# Patient Record
Sex: MALE | Race: WHITE | NOT HISPANIC OR LATINO | Employment: OTHER | URBAN - METROPOLITAN AREA
[De-identification: names, ages, dates, MRNs, and addresses within clinical notes are randomized per-mention and may not be internally consistent; named-entity substitution may affect disease eponyms.]

---

## 2017-01-06 ENCOUNTER — OFFICE VISIT (OUTPATIENT)
Dept: MEDICAL GROUP | Facility: CLINIC | Age: 72
End: 2017-01-06
Payer: MEDICARE

## 2017-01-06 VITALS
TEMPERATURE: 97.3 F | RESPIRATION RATE: 18 BRPM | HEIGHT: 71 IN | BODY MASS INDEX: 32.62 KG/M2 | HEART RATE: 94 BPM | SYSTOLIC BLOOD PRESSURE: 136 MMHG | OXYGEN SATURATION: 91 % | DIASTOLIC BLOOD PRESSURE: 80 MMHG | WEIGHT: 233 LBS

## 2017-01-06 DIAGNOSIS — R73.9 HYPERGLYCEMIA: ICD-10-CM

## 2017-01-06 DIAGNOSIS — K21.9 GASTROESOPHAGEAL REFLUX DISEASE WITHOUT ESOPHAGITIS: ICD-10-CM

## 2017-01-06 DIAGNOSIS — D75.89 MACROCYTOSIS WITHOUT ANEMIA: ICD-10-CM

## 2017-01-06 DIAGNOSIS — Z23 NEED FOR VACCINATION: ICD-10-CM

## 2017-01-06 DIAGNOSIS — E87.6 HYPOKALEMIA: ICD-10-CM

## 2017-01-06 DIAGNOSIS — E78.5 DYSLIPIDEMIA: ICD-10-CM

## 2017-01-06 DIAGNOSIS — I10 ESSENTIAL HYPERTENSION: ICD-10-CM

## 2017-01-06 PROCEDURE — 99204 OFFICE O/P NEW MOD 45 MIN: CPT | Mod: 25 | Performed by: INTERNAL MEDICINE

## 2017-01-06 PROCEDURE — G0009 ADMIN PNEUMOCOCCAL VACCINE: HCPCS | Performed by: INTERNAL MEDICINE

## 2017-01-06 PROCEDURE — 90732 PPSV23 VACC 2 YRS+ SUBQ/IM: CPT | Performed by: INTERNAL MEDICINE

## 2017-01-06 RX ORDER — POTASSIUM CHLORIDE 750 MG/1
20 TABLET, FILM COATED, EXTENDED RELEASE ORAL DAILY
Qty: 90 TAB | Refills: 1 | Status: SHIPPED | OUTPATIENT
Start: 2017-01-06 | End: 2017-07-24 | Stop reason: SDUPTHER

## 2017-01-06 ASSESSMENT — PATIENT HEALTH QUESTIONNAIRE - PHQ9: CLINICAL INTERPRETATION OF PHQ2 SCORE: 0

## 2017-01-06 NOTE — PROGRESS NOTES
Subjective:  Juma is a 71 y.o. male with the following   Past Medical History   Diagnosis Date   • Hyperlipidemia    • Hypertension      well controlled on meds   • Indigestion      pt on prilosec   • Heart burn    • Glaucoma      in right eye, no drops   • Arthritis      lower back   • GERD (gastroesophageal reflux disease)    • BPH (benign prostatic hyperplasia)       Family History   Problem Relation Age of Onset   • Cancer Mother 57     breast cancer     The patient is on the following medications:   Current outpatient prescriptions:   •  omeprazole (PRILOSEC) 40 MG delayed-release capsule, Take 1 Cap by mouth every day. (Patient taking differently: Take 40 mg by mouth every evening.), Disp: 90 Cap, Rfl: 3  •  tamsulosin (FLOMAX) 0.4 MG capsule, Take 1 Cap by mouth ONE-HALF HOUR AFTER BREAKFAST., Disp: 90 Cap, Rfl: 3  •  lovastatin (MEVACOR) 20 MG Tab, Take 1 Tab by mouth every day. (Patient taking differently: Take 20 mg by mouth every evening.), Disp: 90 Tab, Rfl: 3  •  hydrochlorothiazide (HYDRODIURIL) 25 MG Tab, Take 1 Tab by mouth every day., Disp: 90 Tab, Rfl: 3  •  metoprolol SR (TOPROL XL) 100 MG TABLET SR 24 HR, Take 1 Tab by mouth 2 Times a Day., Disp: 180 Tab, Rfl: 3  •  potassium chloride CR (K-DUR) 10 MEQ tablet, Take 2 Tabs by mouth every day. (Patient taking differently: Take 20 mEq by mouth every day. Pt states 2000 mg twice a day), Disp: 90 Tab, Rfl: 0  •  Coenzyme Q10 (CO Q10) 100 MG Cap, Take 300 mg by mouth every day., Disp: , Rfl:   •  Docusate Calcium (STOOL SOFTENER PO), Take 3 Tabs by mouth every day., Disp: , Rfl:   •  Cholecalciferol (VITAMIN D) 2000 UNITS CAPS, Take  by mouth., Disp: , Rfl:   •  Calcium Carbonate-Vit D-Min (QC CALCIUM/MINERALS/VITAMIN D PO), Take  by mouth., Disp: , Rfl:   •  MULTIPLE VITAMINS PO, Take  by mouth., Disp: , Rfl:   •  niacin 500 MG TABS, Take 500 mg by mouth every day., Disp: , Rfl:   •  Omega-3 Fatty Acids (FISH OIL) 1200 MG CAPS, Take  by mouth.,  "Disp: , Rfl:     HPI; 71 year-old male patient is here to establish care, currently on hydrochlorothiazide and metoprolol for hypertension denies having chest pain or shortness of breath, on lovastatin and Co  Q10 for dyslipidemia denies having myalgia, on omeprazole for GERD denies having dysphagia, on Flomax for BPH denies having nocturia, requesting refill of potassium for hypokalemia denies having palpitation.  ROS:  See HPI    Blood pressure 136/80, pulse 94, temperature 36.3 °C (97.3 °F), resp. rate 18, height 1.803 m (5' 10.98\"), weight 105.688 kg (233 lb), SpO2 91 %.on RA  Objective:  Patient is well appearing and in no acute distress.  Pharynx is clear.  Neck is soft and supple with no cervical or supraclavicular lymphadenopathy, thyromegaly or masses, no JVD.  Lungs clear to auscultation bilaterally with normal respiratory effort. Abdomen soft, non-tender on palpation,not distended. Heart regular rate and rhythm without murmur. Extremities without any clubbing, cyanosis, or edema.    Assessment and Plan:  1. HTN/ hypokalemia; on hydrochlorothiazide 25 mg daily, metoprolol  mg daily, patient has been out of potassium supplements.      2. GERD; response to omeprazole 40 mg daily      3. BPH; clinically is stable on Flomax 0.4 mg daily      4. Dyslipidemia; lovastatin 20 mg daily at bedtime plus CoQ10 100 mg daily      5. Increased BMI      6. Bladder cancer; status post cystoscopy and tumor resection.       7. Macrocytosis without anemia    8. Request of pneumonia vaccination    Patient is advised on preventive and supportive care, diet and lifestyle modification, daily walking ,exercise and weight loss. Refilled potassium, pneumonia vaccine , check basic labs are tender for evaluation.    Please note that this dictation was created using voice recognition software. I have worked with consultants from the vendor as well as technical experts from [employer] to optimize the interface. I have made " every reasonable attempt to correct obvious errors, but I expect that there are errors of grammar and possibly content that I did not discover before finalizing the note.

## 2017-01-06 NOTE — MR AVS SNAPSHOT
"        Juma Stern   2017 3:20 PM   Office Visit   MRN: [id number]    Department:  [facility]   Dept Phone:  [phone]    Description:  Male : 1945   Provider:  Lenora Drake M.D.           Allergies as of 2017     No Known Allergies      You were diagnosed with     Hypokalemia   [462104]       Essential hypertension   [8812098]       Gastroesophageal reflux disease without esophagitis   [070894]       Macrocytosis without anemia   [877662]       Hyperglycemia   [391235]       Need for vaccination   [407197]         Vital Signs     Blood Pressure Pulse Temperature Respirations Height Weight    136/80 mmHg 94 36.3 °C (97.3 °F) 18 1.803 m (5' 10.98\") 105.688 kg (233 lb)    Body Mass Index Oxygen Saturation Smoking Status             32.51 kg/m2 91% Former Smoker         Basic Information     Date Of Birth Sex Race Ethnicity Preferred Language    1945 Male White Non- English      Your appointments     2017  3:00 PM   Established Patient with Lenora Drake M.D.   [facility]    [address] [facility] [id number] [phone]           You will be receiving a confirmation call a few days before your appointment from our automated call confirmation system.              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   7/15/2016 - Present      Health Maintenance       " Date Due Completion Dates    IMM ZOSTER VACCINE 7/28/2006 ---    IMM INFLUENZA (1) 9/1/2016 1/14/2016    COLONOSCOPY 12/3/2016 12/3/2013    IMM PNEUMOCOCCAL 65+ (ADULT) LOW/MEDIUM RISK SERIES (2 of 2 - PPSV23) 1/14/2017 1/14/2016    IMM DTaP/Tdap/Td Vaccine (2 - Td) 5/23/2022 5/23/2012            Current Immunizations     13-VALENT PCV PREVNAR 1/14/2016    Influenza Vaccine Quad Inj (Preserved) 1/14/2016    Pneumococcal polysaccharide vaccine (PPSV-23)  Incomplete    Tdap Vaccine 5/23/2012      Below and/or attached are the medications your provider expects you to take. Review all of your home medications and newly ordered medications with your provider and/or pharmacist. Follow medication instructions as directed by your provider and/or pharmacist. Please keep your medication list with you and share with your provider. Update the information when medications are discontinued, doses are changed, or new medications (including over-the-counter products) are added; and carry medication information at all times in the event of emergency situations     Allergies:  No Known Allergies          Medications  Valid as of: January 06, 2017 -  3:20 PM    Generic Name Brand Name Tablet Size Instructions for use    Calcium Carbonate-Vit D-Min   Take  by mouth.        Cholecalciferol (Cap) Vitamin D 2000 UNITS Take  by mouth.        Coenzyme Q10 (Cap) Co Q10 100 MG Take 300 mg by mouth every day.        Docusate Calcium   Take 3 Tabs by mouth every day.        HydroCHLOROthiazide (Tab) HYDRODIURIL 25 MG Take 1 Tab by mouth every day.        Lovastatin (Tab) MEVACOR 20 MG Take 1 Tab by mouth every day.        Metoprolol Succinate (TABLET SR 24 HR) TOPROL  MG Take 1 Tab by mouth 2 Times a Day.        Multiple Vitamin   Take  by mouth.        Niacin (Tab) niacin 500 MG Take 500 mg by mouth every day.        Omega-3 Fatty Acids (Cap) Fish Oil 1200 MG Take  by mouth.        Omeprazole (CAPSULE DELAYED RELEASE) PRILOSEC 40 MG Take  1 Cap by mouth every day.        Potassium Chloride (Tab CR) KLOR-CON 10 MEQ Take 2 Tabs by mouth every day.        Tamsulosin HCl (Cap) FLOMAX 0.4 MG Take 1 Cap by mouth ONE-HALF HOUR AFTER BREAKFAST.        .                 Medicines prescribed today were sent to:     [id number] - [place], [place] - [address] [place] [address]    Phone: [phone] Fax: [phone]    Open 24 Hours?: No      Medication refill instructions:       If your prescription bottle indicates you have medication refills left, it is not necessary to call your provider’s office. Please contact your pharmacy and they will refill your medication.    If your prescription bottle indicates you do not have any refills left, you may request refills at any time through one of the following ways: The online YouScan system (except Urgent Care), by calling your provider’s office, or by asking your pharmacy to contact your provider’s office with a refill request. Medication refills are processed only during regular business hours and may not be available until the next business day. Your provider may request additional information or to have a follow-up visit with you prior to refilling your medication.   *Please Note: Medication refills are assigned a new Rx number when refilled electronically. Your pharmacy may indicate that no refills were authorized even though a new prescription for the same medication is available at the pharmacy. Please request the medicine by name with the pharmacy before contacting your provider for a refill.        Your To Do List     Future Labs/Procedures Complete By Expires    CBC WITH DIFFERENTIAL  As directed 1/7/2018    FOLATE  As directed 1/7/2018    HEMOGLOBIN A1C  As directed 1/7/2018    VITAMIN B12  As directed 1/7/2018         YouScan Access Code: Activation code not generated  Current YouScan Status: Active

## 2017-01-10 DIAGNOSIS — K21.9 GASTROESOPHAGEAL REFLUX DISEASE WITHOUT ESOPHAGITIS: ICD-10-CM

## 2017-01-10 DIAGNOSIS — I10 ESSENTIAL HYPERTENSION: ICD-10-CM

## 2017-01-10 DIAGNOSIS — E78.00 HYPERCHOLESTEREMIA: ICD-10-CM

## 2017-01-10 DIAGNOSIS — N40.0 BENIGN PROSTATIC HYPERPLASIA, PRESENCE OF LOWER URINARY TRACT SYMPTOMS UNSPECIFIED, UNSPECIFIED MORPHOLOGY: ICD-10-CM

## 2017-01-11 ENCOUNTER — TELEPHONE (OUTPATIENT)
Dept: MEDICAL GROUP | Facility: CLINIC | Age: 72
End: 2017-01-11

## 2017-01-11 RX ORDER — METOPROLOL SUCCINATE 100 MG/1
100 TABLET, EXTENDED RELEASE ORAL 2 TIMES DAILY
Qty: 180 TAB | Refills: 0 | Status: SHIPPED | OUTPATIENT
Start: 2017-01-11 | End: 2017-04-13 | Stop reason: SDUPTHER

## 2017-01-11 RX ORDER — TAMSULOSIN HYDROCHLORIDE 0.4 MG/1
0.4 CAPSULE ORAL
Qty: 90 CAP | Refills: 0 | Status: SHIPPED | OUTPATIENT
Start: 2017-01-11 | End: 2017-04-13 | Stop reason: SDUPTHER

## 2017-01-11 RX ORDER — LOVASTATIN 20 MG/1
20 TABLET ORAL DAILY
Qty: 90 TAB | Refills: 3 | Status: SHIPPED | OUTPATIENT
Start: 2017-01-11 | End: 2017-07-24 | Stop reason: SDUPTHER

## 2017-01-11 RX ORDER — HYDROCHLOROTHIAZIDE 25 MG/1
25 TABLET ORAL DAILY
Qty: 90 TAB | Refills: 0 | Status: SHIPPED | OUTPATIENT
Start: 2017-01-11 | End: 2017-04-13 | Stop reason: SDUPTHER

## 2017-01-11 RX ORDER — OMEPRAZOLE 40 MG/1
40 CAPSULE, DELAYED RELEASE ORAL DAILY
Qty: 90 CAP | Refills: 3 | Status: SHIPPED | OUTPATIENT
Start: 2017-01-11 | End: 2017-07-24 | Stop reason: SDUPTHER

## 2017-01-11 NOTE — TELEPHONE ENCOUNTER
I rec'd a phone call from [facility] they are asking for clarification on the metoprolol ER that was sent in for BID, usually is taken daily.  They would like a call back @ PH: [phone] Noel or Jaja.  To Dr. Drake to advise, thank you.

## 2017-01-12 NOTE — TELEPHONE ENCOUNTER
Please call and inform [facility] pharmacy based on our record patient is on metoprolol  mg once daily. Thanks

## 2017-02-02 ENCOUNTER — HOSPITAL ENCOUNTER (OUTPATIENT)
Dept: LAB | Facility: MEDICAL CENTER | Age: 72
End: 2017-02-02
Attending: INTERNAL MEDICINE
Payer: MEDICARE

## 2017-02-02 DIAGNOSIS — D75.89 MACROCYTOSIS WITHOUT ANEMIA: ICD-10-CM

## 2017-02-02 DIAGNOSIS — R73.9 HYPERGLYCEMIA: ICD-10-CM

## 2017-02-02 LAB
ALBUMIN SERPL BCP-MCNC: 4.5 G/DL (ref 3.2–4.9)
ALBUMIN/GLOB SERPL: 1.5 G/DL
ALP SERPL-CCNC: 67 U/L (ref 30–99)
ALT SERPL-CCNC: 31 U/L (ref 2–50)
ANION GAP SERPL CALC-SCNC: 8 MMOL/L (ref 0–11.9)
AST SERPL-CCNC: 24 U/L (ref 12–45)
BASOPHILS # BLD AUTO: 0.07 K/UL (ref 0–0.12)
BASOPHILS NFR BLD AUTO: 0.9 % (ref 0–1.8)
BILIRUB SERPL-MCNC: 0.9 MG/DL (ref 0.1–1.5)
BUN SERPL-MCNC: 19 MG/DL (ref 8–22)
CALCIUM SERPL-MCNC: 9.5 MG/DL (ref 8.5–10.5)
CHLORIDE SERPL-SCNC: 104 MMOL/L (ref 96–112)
CO2 SERPL-SCNC: 27 MMOL/L (ref 20–33)
CREAT SERPL-MCNC: 0.79 MG/DL (ref 0.5–1.4)
EOSINOPHIL # BLD: 0.19 K/UL (ref 0–0.51)
EOSINOPHIL NFR BLD AUTO: 2.5 % (ref 0–6.9)
ERYTHROCYTE [DISTWIDTH] IN BLOOD BY AUTOMATED COUNT: 44.2 FL (ref 35.9–50)
EST. AVERAGE GLUCOSE BLD GHB EST-MCNC: 111 MG/DL
FOLATE SERPL-MCNC: >23.6 NG/ML
GLOBULIN SER CALC-MCNC: 3 G/DL (ref 1.9–3.5)
GLUCOSE SERPL-MCNC: 109 MG/DL (ref 65–99)
HBA1C MFR BLD: 5.5 % (ref 0–5.6)
HCT VFR BLD AUTO: 45 % (ref 42–52)
HGB BLD-MCNC: 15.1 G/DL (ref 14–18)
IMM GRANULOCYTES # BLD AUTO: 0.02 K/UL (ref 0–0.11)
IMM GRANULOCYTES NFR BLD AUTO: 0.3 % (ref 0–0.9)
LYMPHOCYTES # BLD: 2.3 K/UL (ref 1–4.8)
LYMPHOCYTES NFR BLD AUTO: 30.3 % (ref 22–41)
MCH RBC QN AUTO: 34.2 PG (ref 27–33)
MCHC RBC AUTO-ENTMCNC: 33.6 G/DL (ref 33.7–35.3)
MCV RBC AUTO: 101.8 FL (ref 81.4–97.8)
MONOCYTES # BLD: 0.85 K/UL (ref 0–0.85)
MONOCYTES NFR BLD AUTO: 11.2 % (ref 0–13.4)
NEUTROPHILS # BLD: 4.16 K/UL (ref 1.82–7.42)
NEUTROPHILS NFR BLD AUTO: 54.8 % (ref 44–72)
NRBC # BLD AUTO: 0 K/UL
NRBC BLD-RTO: 0 /100 WBC
PLATELET # BLD AUTO: 216 K/UL (ref 164–446)
PMV BLD AUTO: 9.9 FL (ref 9–12.9)
POTASSIUM SERPL-SCNC: 3.6 MMOL/L (ref 3.6–5.5)
PROT SERPL-MCNC: 7.5 G/DL (ref 6–8.2)
RBC # BLD AUTO: 4.42 M/UL (ref 4.7–6.1)
SODIUM SERPL-SCNC: 139 MMOL/L (ref 135–145)
VIT B12 SERPL-MCNC: 1107 PG/ML (ref 211–911)
WBC # BLD AUTO: 7.6 K/UL (ref 4.8–10.8)

## 2017-02-02 PROCEDURE — 85025 COMPLETE CBC W/AUTO DIFF WBC: CPT

## 2017-02-02 PROCEDURE — 82746 ASSAY OF FOLIC ACID SERUM: CPT

## 2017-02-02 PROCEDURE — 80053 COMPREHEN METABOLIC PANEL: CPT

## 2017-02-02 PROCEDURE — 36415 COLL VENOUS BLD VENIPUNCTURE: CPT

## 2017-02-02 PROCEDURE — 83695 ASSAY OF LIPOPROTEIN(A): CPT

## 2017-02-02 PROCEDURE — 82607 VITAMIN B-12: CPT

## 2017-02-02 PROCEDURE — 83036 HEMOGLOBIN GLYCOSYLATED A1C: CPT

## 2017-02-04 LAB — LPA SERPL-MCNC: 62 MG/DL

## 2017-02-07 ENCOUNTER — HOSPITAL ENCOUNTER (OUTPATIENT)
Facility: MEDICAL CENTER | Age: 72
End: 2017-02-07
Attending: UROLOGY
Payer: MEDICARE

## 2017-02-07 LAB — CYTOLOGY REG CYTOL: NORMAL

## 2017-02-07 PROCEDURE — 88112 CYTOPATH CELL ENHANCE TECH: CPT

## 2017-02-08 ENCOUNTER — OFFICE VISIT (OUTPATIENT)
Dept: MEDICAL GROUP | Facility: CLINIC | Age: 72
End: 2017-02-08
Payer: MEDICARE

## 2017-02-08 VITALS
WEIGHT: 230 LBS | SYSTOLIC BLOOD PRESSURE: 138 MMHG | DIASTOLIC BLOOD PRESSURE: 80 MMHG | TEMPERATURE: 97.6 F | BODY MASS INDEX: 32.2 KG/M2 | RESPIRATION RATE: 18 BRPM | HEART RATE: 74 BPM | OXYGEN SATURATION: 94 % | HEIGHT: 71 IN

## 2017-02-08 DIAGNOSIS — K21.9 GASTROESOPHAGEAL REFLUX DISEASE WITHOUT ESOPHAGITIS: ICD-10-CM

## 2017-02-08 DIAGNOSIS — I10 ESSENTIAL HYPERTENSION: ICD-10-CM

## 2017-02-08 DIAGNOSIS — E66.9 OBESITY (BMI 30-39.9): ICD-10-CM

## 2017-02-08 DIAGNOSIS — D75.89 MACROCYTOSIS: ICD-10-CM

## 2017-02-08 DIAGNOSIS — E78.5 DYSLIPIDEMIA: ICD-10-CM

## 2017-02-08 DIAGNOSIS — N40.0 BENIGN NON-NODULAR PROSTATIC HYPERPLASIA WITHOUT LOWER URINARY TRACT SYMPTOMS: ICD-10-CM

## 2017-02-08 PROCEDURE — 3017F COLORECTAL CA SCREEN DOC REV: CPT | Performed by: INTERNAL MEDICINE

## 2017-02-08 PROCEDURE — G8484 FLU IMMUNIZE NO ADMIN: HCPCS | Performed by: INTERNAL MEDICINE

## 2017-02-08 PROCEDURE — 1101F PT FALLS ASSESS-DOCD LE1/YR: CPT | Performed by: INTERNAL MEDICINE

## 2017-02-08 PROCEDURE — 4040F PNEUMOC VAC/ADMIN/RCVD: CPT | Performed by: INTERNAL MEDICINE

## 2017-02-08 PROCEDURE — G8432 DEP SCR NOT DOC, RNG: HCPCS | Performed by: INTERNAL MEDICINE

## 2017-02-08 PROCEDURE — 99214 OFFICE O/P EST MOD 30 MIN: CPT | Performed by: INTERNAL MEDICINE

## 2017-02-08 PROCEDURE — 1036F TOBACCO NON-USER: CPT | Performed by: INTERNAL MEDICINE

## 2017-02-08 PROCEDURE — G8419 CALC BMI OUT NRM PARAM NOF/U: HCPCS | Performed by: INTERNAL MEDICINE

## 2017-02-08 NOTE — MR AVS SNAPSHOT
"        Juma Stern   2017 3:00 PM   Office Visit   MRN: [id number]    Department:  [facility]   Dept Phone:  [phone]    Description:  Male : 1945   Provider:  Lenora Drake M.D.           Allergies as of 2017     No Known Allergies      You were diagnosed with     Essential hypertension   [9867799]       Dyslipidemia   [373833]       Macrocytosis   [508672]         Vital Signs     Blood Pressure Pulse Temperature Respirations Height Weight    138/80 mmHg 74 36.4 °C (97.6 °F) 18 1.803 m (5' 10.98\") 104.327 kg (230 lb)    Body Mass Index Oxygen Saturation Smoking Status             32.09 kg/m2 94% Former Smoker         Basic Information     Date Of Birth Sex Race Ethnicity Preferred Language    1945 Male White Non- English      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 Review all of your " home medications and newly ordered medications with your provider and/or pharmacist. Follow medication instructions as directed by your provider and/or pharmacist. Please keep your medication list with you and share with your provider. Update the information when medications are discontinued, doses are changed, or new medications (including over-the-counter products) are added; and carry medication information at all times in the event of emergency situations     Allergies:  No Known Allergies          Medications  Valid as of: February 08, 2017 -  3:24 PM    Generic Name Brand Name Tablet Size Instructions for use    Calcium Carbonate-Vit D-Min   Take  by mouth.        Cholecalciferol (Cap) Vitamin D 2000 UNITS Take  by mouth.        Coenzyme Q10 (Cap) Co Q10 100 MG Take 300 mg by mouth every day.        Docusate Calcium   Take 3 Tabs by mouth every day.        HydroCHLOROthiazide (Tab) HYDRODIURIL 25 MG Take 1 Tab by mouth every day.        Lovastatin (Tab) MEVACOR 20 MG Take 1 Tab by mouth every day.        Metoprolol Succinate (TABLET SR 24 HR) TOPROL  MG Take 1 Tab by mouth 2 Times a Day.        Multiple Vitamin   Take  by mouth.        Niacin (Tab) niacin 500 MG Take 500 mg by mouth every day.        Omega-3 Fatty Acids (Cap) Fish Oil 1200 MG Take  by mouth.        Omeprazole (CAPSULE DELAYED RELEASE) PRILOSEC 40 MG Take 1 Cap by mouth every day.        Potassium Chloride (Tab CR) KLOR-CON 10 MEQ Take 2 Tabs by mouth every day.        Tamsulosin HCl (Cap) FLOMAX 0.4 MG Take 1 Cap by mouth ONE-HALF HOUR AFTER BREAKFAST.        .                 Medicines prescribed today were sent to:     [facility] [address] [place] [address], [place] - [address] [address]    Phone: [phone] Fax: [phone]    Open 24 Hours?: No      Medication refill instructions:       If your prescription bottle indicates you have medication refills left, it is not necessary to call your provider’s  office. Please contact your pharmacy and they will refill your medication.    If your prescription bottle indicates you do not have any refills left, you may request refills at any time through one of the following ways: The online [employer] system (except Urgent Care), by calling your provider’s office, or by asking your pharmacy to contact your provider’s office with a refill request. Medication refills are processed only during regular business hours and may not be available until the next business day. Your provider may request additional information or to have a follow-up visit with you prior to refilling your medication.   *Please Note: Medication refills are assigned a new Rx number when refilled electronically. Your pharmacy may indicate that no refills were authorized even though a new prescription for the same medication is available at the pharmacy. Please request the medicine by name with the pharmacy before contacting your provider for a refill.        Your To Do List     Future Labs/Procedures Complete By Expires    CBC WITH DIFFERENTIAL  As directed 2/9/2018    CREATININE  As directed 2/8/2018    LIPOPROTEIN A  As directed 2/8/2018    VITAMIN B12  As directed 2/9/2018         [employer]ingo Access Code: Activation code not generated  Current Colingo Status: Active

## 2017-02-08 NOTE — PROGRESS NOTES
Subjective:  Juma is a 71 y.o. male with the following   Past Medical History   Diagnosis Date   • Hyperlipidemia    • Hypertension      well controlled on meds   • Indigestion      pt on prilosec   • Heart burn    • Glaucoma      in right eye, no drops   • Arthritis      lower back   • GERD (gastroesophageal reflux disease)    • BPH (benign prostatic hyperplasia)       Family History   Problem Relation Age of Onset   • Cancer Mother 57     breast cancer     The patient is on the following medications:   Current outpatient prescriptions:   •  tamsulosin (FLOMAX) 0.4 MG capsule, Take 1 Cap by mouth ONE-HALF HOUR AFTER BREAKFAST., Disp: 90 Cap, Rfl: 0  •  omeprazole (PRILOSEC) 40 MG delayed-release capsule, Take 1 Cap by mouth every day., Disp: 90 Cap, Rfl: 3  •  metoprolol SR (TOPROL XL) 100 MG TABLET SR 24 HR, Take 1 Tab by mouth 2 Times a Day., Disp: 180 Tab, Rfl: 0  •  lovastatin (MEVACOR) 20 MG Tab, Take 1 Tab by mouth every day., Disp: 90 Tab, Rfl: 3  •  hydrochlorothiazide (HYDRODIURIL) 25 MG Tab, Take 1 Tab by mouth every day., Disp: 90 Tab, Rfl: 0  •  potassium chloride ER (KLOR-CON) 10 MEQ tablet, Take 2 Tabs by mouth every day., Disp: 90 Tab, Rfl: 1  •  Coenzyme Q10 (CO Q10) 100 MG Cap, Take 300 mg by mouth every day., Disp: , Rfl:   •  Docusate Calcium (STOOL SOFTENER PO), Take 3 Tabs by mouth every day., Disp: , Rfl:   •  Cholecalciferol (VITAMIN D) 2000 UNITS CAPS, Take  by mouth., Disp: , Rfl:   •  Calcium Carbonate-Vit D-Min (QC CALCIUM/MINERALS/VITAMIN D PO), Take  by mouth., Disp: , Rfl:   •  MULTIPLE VITAMINS PO, Take  by mouth., Disp: , Rfl:   •  niacin 500 MG TABS, Take 500 mg by mouth every day., Disp: , Rfl:   •  Omega-3 Fatty Acids (FISH OIL) 1200 MG CAPS, Take  by mouth., Disp: , Rfl:     HPI; Patient is here today for follow-up visit regarding his recent labs, currently on hydrochlorothiazide and metoprolol for hypertension and potassium for hypokalemia denies having chest pain, shortness  "of breath or palpitation. Patient is also on omeprazole for GERD denies having dysphagia, on Flomax for BPH denies having nocturia, on lovastatin plus CoQ10 denies having myalgia fatigue.   ROS:  See HPI    Blood pressure 138/80, pulse 74, temperature 36.4 °C (97.6 °F), resp. rate 18, height 1.803 m (5' 10.98\"), weight 104.327 kg (230 lb), SpO2 94 %.on RA  Objective:  Patient is well appearing and in no acute distress.  Pharynx is clear.  Neck is soft and supple with no cervical or supraclavicular lymphadenopathy, thyromegaly or masses, no JVD.  Lungs clear to auscultation bilaterally with normal respiratory effort. Abdomen soft, non-tender on palpation,not distended. Heart regular rate and rhythm without murmur. Extremities without any clubbing, cyanosis, or edema.    Assessment and Plan:  1. HTN/ hypokalemia; on hydrochlorothiazide 25 mg daily, metoprolol  mg daily,  potassium supplements.   Ref. Range 7/8/2016 14:48 2/2/2017 12:20   Potassium Latest Ref Range: 3.6-5.5 mmol/L 3.2 (L) 3.6         2. GERD; responds to omeprazole 40 mg daily      3. BPH; clinically is stable on Flomax 0.4 mg daily      4. Dyslipidemia; lovastatin 20 mg daily at bedtime plus CoQ10 100 mg daily.    Ref. Range 2/26/2016 10:35 2/2/2017 12:20   Cholesterol,Tot Latest Ref Range: 100-199 mg/dL 176    Triglycerides Latest Ref Range: 0-149 mg/dL 129    HDL Latest Ref Range: >=40 mg/dL 36 (A)    LDL Latest Ref Range: <100 mg/dL 114 (H)    Lipoprotein A Latest Ref Range: <=29 mg/dL  62 (H)         5. Increased BMI      6. Bladder cancer; status post cystoscopy and tumor resection.       7. Macrocytosis without anemia; patient denies excessive chronic drinking, currently off B12 supplements.    Ref. Range 4/18/2016 15:35 7/8/2016 11:00 2/2/2017 12:20   Hemoglobin Latest Ref Range: 14.0-18.0 g/dL 15.1 15.9 15.1   Hematocrit Latest Ref Range: 42.0-52.0 % 44.5 46.6 45.0   MCV Latest Ref Range: 81.4-97.8 fL 98.7 (H) 100.9 (H) 101.8 (H)      " Ref. Range 2/2/2017 12:20   Vitamin B12 -True Cobalamin Latest Ref Range: 211-911 pg/mL 1107 (H)   Folate -Folic Acid Latest Ref Range: >4.0 ng/mL >23.6     8. History of hyperglycemia   Ref. Range 2/2/2017 12:20   Glycohemoglobin Latest Ref Range: 0.0-5.6 % 5.5       Patient is advised on preventive and supportive care, diet and lifestyle modification, daily walking ,exercise and weight loss. Discussed alternative therapies were elevated lipoprotein a , Continue current meds and monitor labs.         Please note that this dictation was created using voice recognition software. I have worked with consultants from the vendor as well as technical experts from [employer] to optimize the interface. I have made every reasonable attempt to correct obvious errors, but I expect that there are errors of grammar and possibly content that I did not discover before finalizing the note.

## 2017-02-09 PROBLEM — E66.9 OBESITY (BMI 30-39.9): Status: ACTIVE | Noted: 2017-02-09

## 2017-04-13 DIAGNOSIS — N40.0 BENIGN PROSTATIC HYPERPLASIA, PRESENCE OF LOWER URINARY TRACT SYMPTOMS UNSPECIFIED, UNSPECIFIED MORPHOLOGY: ICD-10-CM

## 2017-04-13 DIAGNOSIS — I10 ESSENTIAL HYPERTENSION: ICD-10-CM

## 2017-04-14 RX ORDER — METOPROLOL SUCCINATE 100 MG/1
TABLET, EXTENDED RELEASE ORAL
Qty: 180 TAB | Refills: 0 | Status: SHIPPED | OUTPATIENT
Start: 2017-04-14 | End: 2017-07-24 | Stop reason: SDUPTHER

## 2017-04-14 RX ORDER — HYDROCHLOROTHIAZIDE 25 MG/1
TABLET ORAL
Qty: 90 TAB | Refills: 0 | Status: SHIPPED | OUTPATIENT
Start: 2017-04-14 | End: 2017-07-24 | Stop reason: SDUPTHER

## 2017-04-14 RX ORDER — TAMSULOSIN HYDROCHLORIDE 0.4 MG/1
CAPSULE ORAL
Qty: 90 CAP | Refills: 0 | Status: SHIPPED | OUTPATIENT
Start: 2017-04-14 | End: 2017-07-24 | Stop reason: SDUPTHER

## 2017-05-16 ENCOUNTER — HOSPITAL ENCOUNTER (OUTPATIENT)
Dept: LAB | Facility: MEDICAL CENTER | Age: 72
End: 2017-05-16
Attending: UROLOGY
Payer: MEDICARE

## 2017-05-16 LAB — CYTOLOGY REG CYTOL: NORMAL

## 2017-05-16 PROCEDURE — 88112 CYTOPATH CELL ENHANCE TECH: CPT

## 2017-07-10 ENCOUNTER — PATIENT OUTREACH (OUTPATIENT)
Dept: HEALTH INFORMATION MANAGEMENT | Facility: OTHER | Age: 72
End: 2017-07-10

## 2017-07-10 NOTE — PROGRESS NOTES
Outcome: NO ANSWER // NO VOICEMAIL SET UP    WebIZ Checked & Epic Updated:  yes    [facility] Verified: yes    Attempt # 1ST

## 2017-07-17 ENCOUNTER — TELEPHONE (OUTPATIENT)
Dept: HEALTH INFORMATION MANAGEMENT | Facility: OTHER | Age: 72
End: 2017-07-17

## 2017-07-17 DIAGNOSIS — Z12.11 SCREENING FOR COLON CANCER: ICD-10-CM

## 2017-07-17 NOTE — PROGRESS NOTES
Attempt #:2    WebIZ Checked & Epic Updated: yes  [facility] Verified: yes  Verify PCP: yes    Communication Preference Obtained: yes     Review Care Team: yes    Annual Wellness Visit Scheduling  1. Scheduling Status:Scheduled          Care Gap Scheduling (Attempt to Schedule EACH Overdue Care Gap!)     Health Maintenance Due   Topic Date Due   • Annual Wellness Visit  Scheduled   • IMM ZOSTER VACCINE  Scheduled   • COLONOSCOPY  Referral placed         MyChart Activation: already active  [employer] Brain: no  Virtual Visits: no  Opt In to Text Messages: no

## 2017-07-18 ENCOUNTER — HOSPITAL ENCOUNTER (OUTPATIENT)
Dept: LAB | Facility: MEDICAL CENTER | Age: 72
End: 2017-07-18
Attending: INTERNAL MEDICINE
Payer: MEDICARE

## 2017-07-18 DIAGNOSIS — E78.5 DYSLIPIDEMIA: ICD-10-CM

## 2017-07-18 DIAGNOSIS — I10 ESSENTIAL HYPERTENSION: ICD-10-CM

## 2017-07-18 DIAGNOSIS — D75.89 MACROCYTOSIS: ICD-10-CM

## 2017-07-18 LAB
BASOPHILS # BLD AUTO: 1.1 % (ref 0–1.8)
BASOPHILS # BLD: 0.08 K/UL (ref 0–0.12)
CREAT SERPL-MCNC: 0.71 MG/DL (ref 0.5–1.4)
EOSINOPHIL # BLD AUTO: 0.17 K/UL (ref 0–0.51)
EOSINOPHIL NFR BLD: 2.4 % (ref 0–6.9)
ERYTHROCYTE [DISTWIDTH] IN BLOOD BY AUTOMATED COUNT: 41.3 FL (ref 35.9–50)
GFR SERPL CREATININE-BSD FRML MDRD: >60 ML/MIN/1.73 M 2
HCT VFR BLD AUTO: 45 % (ref 42–52)
HGB BLD-MCNC: 15.4 G/DL (ref 14–18)
IMM GRANULOCYTES # BLD AUTO: 0.02 K/UL (ref 0–0.11)
IMM GRANULOCYTES NFR BLD AUTO: 0.3 % (ref 0–0.9)
LYMPHOCYTES # BLD AUTO: 1.93 K/UL (ref 1–4.8)
LYMPHOCYTES NFR BLD: 27 % (ref 22–41)
MCH RBC QN AUTO: 33.5 PG (ref 27–33)
MCHC RBC AUTO-ENTMCNC: 34.2 G/DL (ref 33.7–35.3)
MCV RBC AUTO: 97.8 FL (ref 81.4–97.8)
MONOCYTES # BLD AUTO: 0.7 K/UL (ref 0–0.85)
MONOCYTES NFR BLD AUTO: 9.8 % (ref 0–13.4)
NEUTROPHILS # BLD AUTO: 4.26 K/UL (ref 1.82–7.42)
NEUTROPHILS NFR BLD: 59.4 % (ref 44–72)
NRBC # BLD AUTO: 0 K/UL
NRBC BLD AUTO-RTO: 0 /100 WBC
PLATELET # BLD AUTO: 212 K/UL (ref 164–446)
PMV BLD AUTO: 10 FL (ref 9–12.9)
RBC # BLD AUTO: 4.6 M/UL (ref 4.7–6.1)
VIT B12 SERPL-MCNC: 1108 PG/ML (ref 211–911)
WBC # BLD AUTO: 7.2 K/UL (ref 4.8–10.8)

## 2017-07-18 PROCEDURE — 85025 COMPLETE CBC W/AUTO DIFF WBC: CPT

## 2017-07-18 PROCEDURE — 82607 VITAMIN B-12: CPT

## 2017-07-18 PROCEDURE — 83695 ASSAY OF LIPOPROTEIN(A): CPT

## 2017-07-18 PROCEDURE — 82565 ASSAY OF CREATININE: CPT

## 2017-07-18 PROCEDURE — 36415 COLL VENOUS BLD VENIPUNCTURE: CPT

## 2017-07-20 LAB — LPA SERPL-MCNC: 37 MG/DL

## 2017-07-24 ENCOUNTER — OFFICE VISIT (OUTPATIENT)
Dept: MEDICAL GROUP | Facility: PHYSICIAN GROUP | Age: 72
End: 2017-07-24
Payer: MEDICARE

## 2017-07-24 VITALS
BODY MASS INDEX: 33.46 KG/M2 | HEART RATE: 87 BPM | HEIGHT: 71 IN | DIASTOLIC BLOOD PRESSURE: 74 MMHG | OXYGEN SATURATION: 91 % | TEMPERATURE: 98.2 F | RESPIRATION RATE: 18 BRPM | SYSTOLIC BLOOD PRESSURE: 138 MMHG | WEIGHT: 239 LBS

## 2017-07-24 DIAGNOSIS — E78.5 DYSLIPIDEMIA: ICD-10-CM

## 2017-07-24 DIAGNOSIS — N40.0 BENIGN PROSTATIC HYPERPLASIA, PRESENCE OF LOWER URINARY TRACT SYMPTOMS UNSPECIFIED, UNSPECIFIED MORPHOLOGY: ICD-10-CM

## 2017-07-24 DIAGNOSIS — E87.6 HYPOKALEMIA: ICD-10-CM

## 2017-07-24 DIAGNOSIS — E66.9 OBESITY (BMI 30-39.9): ICD-10-CM

## 2017-07-24 DIAGNOSIS — D75.89 MACROCYTOSIS WITHOUT ANEMIA: ICD-10-CM

## 2017-07-24 DIAGNOSIS — K21.9 GASTROESOPHAGEAL REFLUX DISEASE WITHOUT ESOPHAGITIS: ICD-10-CM

## 2017-07-24 DIAGNOSIS — I10 ESSENTIAL HYPERTENSION: ICD-10-CM

## 2017-07-24 PROCEDURE — 99214 OFFICE O/P EST MOD 30 MIN: CPT | Performed by: INTERNAL MEDICINE

## 2017-07-24 RX ORDER — OMEPRAZOLE 40 MG/1
40 CAPSULE, DELAYED RELEASE ORAL DAILY
Qty: 90 CAP | Refills: 3 | Status: SHIPPED | OUTPATIENT
Start: 2017-07-24 | End: 2018-02-07 | Stop reason: SDUPTHER

## 2017-07-24 RX ORDER — TAMSULOSIN HYDROCHLORIDE 0.4 MG/1
0.4 CAPSULE ORAL
Qty: 90 CAP | Refills: 3 | Status: SHIPPED | OUTPATIENT
Start: 2017-07-24 | End: 2018-02-07 | Stop reason: SDUPTHER

## 2017-07-24 RX ORDER — POTASSIUM CHLORIDE 750 MG/1
20 TABLET, FILM COATED, EXTENDED RELEASE ORAL DAILY
Qty: 90 TAB | Refills: 3 | Status: SHIPPED | OUTPATIENT
Start: 2017-07-24 | End: 2018-02-07 | Stop reason: SDUPTHER

## 2017-07-24 RX ORDER — HYDROCHLOROTHIAZIDE 25 MG/1
25 TABLET ORAL
Qty: 90 TAB | Refills: 3 | Status: SHIPPED | OUTPATIENT
Start: 2017-07-24 | End: 2018-02-07 | Stop reason: SDUPTHER

## 2017-07-24 RX ORDER — METOPROLOL SUCCINATE 100 MG/1
200 TABLET, EXTENDED RELEASE ORAL DAILY
Qty: 180 TAB | Refills: 3 | Status: SHIPPED | OUTPATIENT
Start: 2017-07-24 | End: 2018-02-07 | Stop reason: SDUPTHER

## 2017-07-24 RX ORDER — LOVASTATIN 20 MG/1
20 TABLET ORAL DAILY
Qty: 90 TAB | Refills: 3 | Status: SHIPPED | OUTPATIENT
Start: 2017-07-24 | End: 2018-02-07 | Stop reason: SDUPTHER

## 2017-07-24 NOTE — PROGRESS NOTES
Subjective:  Juma is a 71 y.o. male with the following   Past Medical History   Diagnosis Date   • Hyperlipidemia    • Hypertension      well controlled on meds   • Indigestion      pt on prilosec   • Heart burn    • Glaucoma      in right eye, no drops   • Arthritis      lower back   • GERD (gastroesophageal reflux disease)    • BPH (benign prostatic hyperplasia)       Family History   Problem Relation Age of Onset   • Cancer Mother 57     breast cancer     The patient is on the following medications:   Current outpatient prescriptions:   •  tamsulosin (FLOMAX) 0.4 MG capsule, TAKE 1 CAPSULE BY MOUTH ONE-HALF HOUR AFTER BREAKFAST., Disp: 90 Cap, Rfl: 0  •  hydrochlorothiazide (HYDRODIURIL) 25 MG Tab, TAKE 1 TABLET BY MOUTH EVERY DAY, Disp: 90 Tab, Rfl: 0  •  metoprolol SR (TOPROL XL) 100 MG TABLET SR 24 HR, TAKE 1 TABLET BY MOUTH TWICE A DAY, Disp: 180 Tab, Rfl: 0  •  omeprazole (PRILOSEC) 40 MG delayed-release capsule, Take 1 Cap by mouth every day., Disp: 90 Cap, Rfl: 3  •  lovastatin (MEVACOR) 20 MG Tab, Take 1 Tab by mouth every day., Disp: 90 Tab, Rfl: 3  •  potassium chloride ER (KLOR-CON) 10 MEQ tablet, Take 2 Tabs by mouth every day., Disp: 90 Tab, Rfl: 1  •  Coenzyme Q10 (CO Q10) 100 MG Cap, Take 300 mg by mouth every day., Disp: , Rfl:   •  Docusate Calcium (STOOL SOFTENER PO), Take 3 Tabs by mouth every day., Disp: , Rfl:   •  Cholecalciferol (VITAMIN D) 2000 UNITS CAPS, Take  by mouth., Disp: , Rfl:   •  Calcium Carbonate-Vit D-Min (QC CALCIUM/MINERALS/VITAMIN D PO), Take  by mouth., Disp: , Rfl:   •  MULTIPLE VITAMINS PO, Take  by mouth., Disp: , Rfl:   •  niacin 500 MG TABS, Take 500 mg by mouth every day., Disp: , Rfl:   •  Omega-3 Fatty Acids (FISH OIL) 1200 MG CAPS, Take  by mouth., Disp: , Rfl:     HPI; Patient is here today for follow-up visit regarding his recent labs, currently on metoprolol, hydrochlorothiazide and potassium for hypertension denies having chest pain, palpitation or shortness  "of breath. Patient continues with omeprazole as needed for care denies having dysphagia, Flomax for BPH denies having nocturia, lovastatin for dyslipidemia denies having myalgia.   ROS:  See HPI    Blood pressure 138/74, pulse 87, temperature 36.8 °C (98.2 °F), resp. rate 18, height 1.803 m (5' 10.98\"), weight 108.41 kg (239 lb), SpO2 91 %.on RA  Objective:  Patient is well appearing and in no acute distress.  Pharynx is clear.  Neck is soft and supple with no cervical or supraclavicular lymphadenopathy, thyromegaly or masses, no JVD.  Lungs clear to auscultation bilaterally with normal respiratory effort. Abdomen soft, non-tender on palpation,not distended. Heart regular rate and rhythm without murmur. Extremities without any clubbing, cyanosis, or edema. Central recently .      Assessment and Plan:  1. HTN/ hypokalemia; on hydrochlorothiazide 25 mg daily, metoprolol  mg daily,  potassium supplements.    Ref. Range  7/8/2016 14:48  2/2/2017 12:20    Potassium  Latest Ref Range: 3.6-5.5 mmol/L  3.2 (L)  3.6          2. GERD; responds to omeprazole 40 mg daily      3. BPH; clinically is stable on Flomax 0.4 mg daily      4. Dyslipidemia;on  lovastatin 20 mg daily at bedtime plus CoQ10 100 mg daily. Also on alternative supplements for elevated lipoprotein A.       Ref. Range 2/2/2017 12:20 7/18/2017 12:02   Lipoprotein A Latest Ref Range: <=29 mg/dL 62 (H) 37 (H)       5. Increased BMI      6. Bladder cancer; status post cystoscopy and tumor resection.       7. Macrocytosis without anemia; patient denies excessive chronic drinking.    Ref. Range 7/8/2016 11:00 2/2/2017 12:20 7/18/2017 12:02   Hemoglobin Latest Ref Range: 14.0-18.0 g/dL 15.9 15.1 15.4   Hematocrit Latest Ref Range: 42.0-52.0 % 46.6 45.0 45.0   MCV Latest Ref Range: 81.4-97.8 fL 100.9 (H) 101.8 (H) 97.8       Patient is advised on preventive and supportive care, diet and lifestyle modification, daily walking ,exercise and weight loss. Continue " current meds and monitor labs.                       Please note that this dictation was created using voice recognition software. I have worked with consultants from the vendor as well as technical experts from [facility] to optimize the interface. I have made every reasonable attempt to correct obvious errors, but I expect that there are errors of grammar and possibly content that I did not discover before finalizing the note.

## 2017-07-24 NOTE — MR AVS SNAPSHOT
"        Juma Stern   2017 3:00 PM   Office Visit   MRN: [id number]    Department:  [facility]   Dept Phone:  [phone]    Description:  Male : 1945   Provider:  Lenora Drake M.D.           Reason for Visit     Follow-Up           Allergies as of 2017     No Known Allergies      You were diagnosed with     Dyslipidemia   [298526]       Essential hypertension   [5374946]       Gastroesophageal reflux disease without esophagitis   [239641]       Macrocytosis without anemia   [488450]       Benign prostatic hyperplasia, presence of lower urinary tract symptoms unspecified, unspecified morphology   [0423345]       Hypokalemia   [348841]         Vital Signs     Blood Pressure Pulse Temperature Respirations Height Weight    138/74 mmHg 87 36.8 °C (98.2 °F) 18 1.803 m (5' 10.98\") 108.41 kg (239 lb)    Body Mass Index Oxygen Saturation Smoking Status             33.35 kg/m2 91% Former Smoker         Basic Information     Date Of Birth Sex Race Ethnicity Preferred Language    1945 Male White Non- English      Your appointments     Aug 11, 2017  1:20 PM   ANNUAL WELLNESS with Lenora Drake M.D., [facility]    [facility] (--)    [address]  [address]  [facility] [address] [phone]              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   7/15/2016 - Present    Obesity (BMI 30-39.9) E66.9   2017 - " Present      Health Maintenance        Date Due Completion Dates    IMM ZOSTER VACCINE 7/28/2006 ---    COLONOSCOPY 12/3/2016 12/3/2013    IMM INFLUENZA (1) 9/1/2017 1/14/2016    IMM DTaP/Tdap/Td Vaccine (2 - Td) 5/23/2022 5/23/2012            Current Immunizations     13-VALENT PCV PREVNAR 1/14/2016    Influenza Vaccine Quad Inj (Preserved) 1/14/2016    Pneumococcal polysaccharide vaccine (PPSV-23) 1/6/2017    Tdap Vaccine 5/23/2012      Below and/or attached are the medications your provider expects you to take. Review all of your home medications and newly ordered medications with your provider and/or pharmacist. Follow medication instructions as directed by your provider and/or pharmacist. Please keep your medication list with you and share with your provider. Update the information when medications are discontinued, doses are changed, or new medications (including over-the-counter products) are added; and carry medication information at all times in the event of emergency situations     Allergies:  No Known Allergies          Medications  Valid as of: July 24, 2017 -  3:44 PM    Generic Name Brand Name Tablet Size Instructions for use    Calcium Carbonate-Vit D-Min   Take  by mouth.        Cholecalciferol (Cap) Vitamin D 2000 UNITS Take  by mouth.        Coenzyme Q10 (Cap) Co Q10 100 MG Take 300 mg by mouth every day.        Docusate Calcium   Take 3 Tabs by mouth every day.        HydroCHLOROthiazide (Tab) HYDRODIURIL 25 MG Take 1 Tab by mouth every day.        Lovastatin (Tab) MEVACOR 20 MG Take 1 Tab by mouth every day.        Metoprolol Succinate (TABLET SR 24 HR) TOPROL  MG Take 2 Tabs by mouth every day.        Multiple Vitamin   Take  by mouth.        Niacin (Tab) niacin 500 MG Take 500 mg by mouth every day.        Omega-3 Fatty Acids (Cap) Fish Oil 1200 MG Take  by mouth.        Omeprazole (CAPSULE DELAYED RELEASE) PRILOSEC 40 MG Take 1 Cap by mouth every day.        Potassium Chloride (Tab CR)  KLOR-CON 10 MEQ Take 2 Tabs by mouth every day.        Tamsulosin HCl (Cap) FLOMAX 0.4 MG Take 1 Cap by mouth ONE-HALF HOUR AFTER BREAKFAST.        .                 Medicines prescribed today were sent to:     [id number] - [place], [place] - [id number] [facility] [address]    Phone: [phone] Fax: [phone]    Open 24 Hours?: No      Medication refill instructions:       If your prescription bottle indicates you have medication refills left, it is not necessary to call your provider’s office. Please contact your pharmacy and they will refill your medication.    If your prescription bottle indicates you do not have any refills left, you may request refills at any time through one of the following ways: The online ShopText system (except Urgent Care), by calling your provider’s office, or by asking your pharmacy to contact your provider’s office with a refill request. Medication refills are processed only during regular business hours and may not be available until the next business day. Your provider may request additional information or to have a follow-up visit with you prior to refilling your medication.   *Please Note: Medication refills are assigned a new Rx number when refilled electronically. Your pharmacy may indicate that no refills were authorized even though a new prescription for the same medication is available at the pharmacy. Please request the medicine by name with the pharmacy before contacting your provider for a refill.        Your To Do List     Future Labs/Procedures Complete By Expires    CREATININE  As directed 7/24/2018    LIPOPROTEIN A  As directed 7/24/2018    VITAMIN B12  As directed 7/24/2018         ShopText Access Code: Activation code not generated  Current ShopText Status: Active

## 2017-08-07 ENCOUNTER — TELEPHONE (OUTPATIENT)
Dept: MEDICAL GROUP | Facility: PHYSICIAN GROUP | Age: 72
End: 2017-08-07

## 2017-08-11 ENCOUNTER — OFFICE VISIT (OUTPATIENT)
Dept: MEDICAL GROUP | Facility: PHYSICIAN GROUP | Age: 72
End: 2017-08-11
Payer: MEDICARE

## 2017-08-11 VITALS
DIASTOLIC BLOOD PRESSURE: 82 MMHG | RESPIRATION RATE: 16 BRPM | BODY MASS INDEX: 33.9 KG/M2 | HEIGHT: 70 IN | TEMPERATURE: 99 F | WEIGHT: 236.77 LBS | SYSTOLIC BLOOD PRESSURE: 138 MMHG | HEART RATE: 79 BPM | OXYGEN SATURATION: 96 %

## 2017-08-11 DIAGNOSIS — I10 ESSENTIAL HYPERTENSION: ICD-10-CM

## 2017-08-11 DIAGNOSIS — E78.5 DYSLIPIDEMIA: ICD-10-CM

## 2017-08-11 DIAGNOSIS — N40.0 BENIGN PROSTATIC HYPERPLASIA WITHOUT LOWER URINARY TRACT SYMPTOMS, UNSPECIFIED MORPHOLOGY: ICD-10-CM

## 2017-08-11 ASSESSMENT — PATIENT HEALTH QUESTIONNAIRE - PHQ9: CLINICAL INTERPRETATION OF PHQ2 SCORE: 0

## 2017-08-11 NOTE — MR AVS SNAPSHOT
"        Juma Stern   2017 1:20 PM   Office Visit   MRN: [id number]    Department:  [facility]   Dept Phone:  [phone]    Description:  Male : 1945   Provider:  Lenora Drake M.D.; [facility]            Reason for Visit     Annual Wellness Visit           Allergies as of 2017     No Known Allergies      Vital Signs     Blood Pressure Pulse Temperature Respirations Height Weight    138/82 mmHg 79 37.2 °C (99 °F) 16 1.765 m (5' 9.5\") 107.4 kg (236 lb 12.4 oz)    Body Mass Index Oxygen Saturation Smoking Status             34.48 kg/m2 96% Former Smoker         Basic Information     Date Of Birth Sex Race Ethnicity Preferred Language    1945 Male White Non- English      Your appointments     2018  3:00 PM   Established Patient with Lenora Drake M.D.   [facility] (--)    [address]  [address]  [place] [id number] [phone]           You will be receiving a confirmation call a few days before your appointment from our automated call confirmation system.            2018  2:40 PM   Established Patient with Lenora Drake M.D.   [facility] [place] (--)    [address]  [address]  [place] [id number] [phone]           You will be receiving a confirmation call a few days before your appointment from our automated call confirmation system.              Problem List              ICD-10-CM Priority Class Noted - Resolved    HTN (hypertension) I10   1/15/2015 - Present    GERD (gastroesophageal reflux disease) K21.9   1/15/2015 - Present    BPH (benign prostatic hyperplasia) N40.0   1/15/2015 - Present    Hypercholesteremia E78.00   1/15/2015 - Present    DDD (degenerative disc disease), lumbar M51.36   1/15/2015 - Present    Abdominal pain, right upper quadrant R10.11   2015 - Present    BMI 32.0-32.9,adult Z68.32   2016 - Present    Elevated glucose R73.09   2016 - Present    Bladder neoplasm of " uncertain malignant potential D41.4   7/15/2016 - Present    Obesity (BMI 30-39.9) E66.9   2/9/2017 - Present      Health Maintenance        Date Due Completion Dates    IMM ZOSTER VACCINE 7/28/2006 ---    COLONOSCOPY 12/3/2016 12/3/2013    IMM INFLUENZA (1) 9/1/2017 1/14/2016    IMM DTaP/Tdap/Td Vaccine (2 - Td) 5/23/2022 5/23/2012            Current Immunizations     13-VALENT PCV PREVNAR 1/14/2016    Influenza Vaccine Quad Inj (Preserved) 1/14/2016    Pneumococcal polysaccharide vaccine (PPSV-23) 1/6/2017    Tdap Vaccine 5/23/2012      Below and/or attached are the medications your provider expects you to take. Review all of your home medications and newly ordered medications with your provider and/or pharmacist. Follow medication instructions as directed by your provider and/or pharmacist. Please keep your medication list with you and share with your provider. Update the information when medications are discontinued, doses are changed, or new medications (including over-the-counter products) are added; and carry medication information at all times in the event of emergency situations     Allergies:  No Known Allergies          Medications  Valid as of: August 11, 2017 -  2:47 PM    Generic Name Brand Name Tablet Size Instructions for use    Calcium Carbonate-Vit D-Min   Take  by mouth.        Cholecalciferol (Cap) Vitamin D 2000 UNITS Take  by mouth.        Coenzyme Q10 (Cap) Co Q10 100 MG Take 300 mg by mouth every day.        Docusate Calcium   Take 3 Tabs by mouth every day.        HydroCHLOROthiazide (Tab) HYDRODIURIL 25 MG Take 1 Tab by mouth every day.        Lovastatin (Tab) MEVACOR 20 MG Take 1 Tab by mouth every day.        Metoprolol Succinate (TABLET SR 24 HR) TOPROL  MG Take 2 Tabs by mouth every day.        Multiple Vitamin   Take  by mouth.        Niacin (Tab) niacin 500 MG Take 500 mg by mouth every day.        Omega-3 Fatty Acids (Cap) Fish Oil 1200 MG Take  by mouth.        Omeprazole  (CAPSULE DELAYED RELEASE) PRILOSEC 40 MG Take 1 Cap by mouth every day.        Potassium Chloride (Tab CR) KLOR-CON 10 MEQ Take 2 Tabs by mouth every day.        Tamsulosin HCl (Cap) FLOMAX 0.4 MG Take 1 Cap by mouth ONE-HALF HOUR AFTER BREAKFAST.        .                 Medicines prescribed today were sent to:     [id number] - [place], [place] - [id number] [facility] [place] [address]    Phone: [phone] Fax: [phone]    Open 24 Hours?: No      Medication refill instructions:       If your prescription bottle indicates you have medication refills left, it is not necessary to call your provider’s office. Please contact your pharmacy and they will refill your medication.    If your prescription bottle indicates you do not have any refills left, you may request refills at any time through one of the following ways: The online Live Gamer system (except Urgent Care), by calling your provider’s office, or by asking your pharmacy to contact your provider’s office with a refill request. Medication refills are processed only during regular business hours and may not be available until the next business day. Your provider may request additional information or to have a follow-up visit with you prior to refilling your medication.   *Please Note: Medication refills are assigned a new Rx number when refilled electronically. Your pharmacy may indicate that no refills were authorized even though a new prescription for the same medication is available at the pharmacy. Please request the medicine by name with the pharmacy before contacting your provider for a refill.           Live Gamer Access Code: Activation code not generated  Current Live Gamer Status: Active

## 2017-08-11 NOTE — PROGRESS NOTES
Chief Complaint   Patient presents with   • Annual Wellness Visit         HPI:  Juma is a 72 y.o. here for Medicare Annual Wellness Visit, currently on hydrochlorothiazide/ potassium and metoprolol for hypertension denies having chest pain, shortness of breath or fatigue, on lovastatin and CoQ10  fordyslipidemia denies having myalgia, on omeprazole for GERD denies having dysphagia, on Flomax for BPH denies having nocturia.        Patient Active Problem List    Diagnosis Date Noted   • Obesity (BMI 30-39.9) 02/09/2017   • Bladder neoplasm of uncertain malignant potential 07/15/2016   • BMI 32.0-32.9,adult 01/14/2016   • Elevated glucose 01/14/2016   • Abdominal pain, right upper quadrant 08/27/2015   • HTN (hypertension) 01/15/2015   • GERD (gastroesophageal reflux disease) 01/15/2015   • BPH (benign prostatic hyperplasia) 01/15/2015   • Hypercholesteremia 01/15/2015   • DDD (degenerative disc disease), lumbar 01/15/2015       Current Outpatient Prescriptions   Medication Sig Dispense Refill   • tamsulosin (FLOMAX) 0.4 MG capsule Take 1 Cap by mouth ONE-HALF HOUR AFTER BREAKFAST. 90 Cap 3   • hydrochlorothiazide (HYDRODIURIL) 25 MG Tab Take 1 Tab by mouth every day. 90 Tab 3   • metoprolol SR (TOPROL XL) 100 MG TABLET SR 24 HR Take 2 Tabs by mouth every day. 180 Tab 3   • omeprazole (PRILOSEC) 40 MG delayed-release capsule Take 1 Cap by mouth every day. 90 Cap 3   • lovastatin (MEVACOR) 20 MG Tab Take 1 Tab by mouth every day. 90 Tab 3   • potassium chloride ER (KLOR-CON) 10 MEQ tablet Take 2 Tabs by mouth every day. 90 Tab 3   • Coenzyme Q10 (CO Q10) 100 MG Cap Take 300 mg by mouth every day.     • Docusate Calcium (STOOL SOFTENER PO) Take 3 Tabs by mouth every day.     • Cholecalciferol (VITAMIN D) 2000 UNITS CAPS Take  by mouth.     • Calcium Carbonate-Vit D-Min (QC CALCIUM/MINERALS/VITAMIN D PO) Take  by mouth.     • MULTIPLE VITAMINS PO Take  by mouth.     • niacin 500 MG TABS Take 500 mg by mouth every day.      • Omega-3 Fatty Acids (FISH OIL) 1200 MG CAPS Take  by mouth.       No current facility-administered medications for this visit.        Patient is taking medications as noted in medication list.  Current supplements as per medication list.     Allergies: Review of patient's allergies indicates no known allergies.    Current social contact/activities: lunch with friends     Is patient current with immunizations? NO Shingles Vaccine    He  reports that he quit smoking about 31 years ago. He has never used smokeless tobacco. He reports that he drinks about 0.5 oz of alcohol per week. He reports that he does not use illicit drugs.  Counseling given: Not Answered        DPA/Advanced directive: Patient has Durable Power of  on file.     ROS:    Gait: Uses no assistive device   Ostomy: no   Other tubes: no   Amputations: no   Chronic oxygen use no   Last eye exam 07/16   Wears hearing aids: no   : Denies incontinence.       Screening:        Little interest or pleasure in doing things?  0 - not at all  Feeling down, depressed, or hopeless? 0 - not at all  Patient Health Questionnaire Score: 0    If depressive symptoms identified deferred to follow up visit unless specifically addressed in assessment and plan.    Interpretation of PHQ-9 Total Score   Score Severity   1-4 No Depression   5-9 Mild Depression   10-14 Moderate Depression   15-19 Moderately Severe Depression   20-27 Severe Depression    Screening for Cognitive Impairment    Three Minute Recall (apple, watch, kelly)  3/3    Draw clock face with all 12 numbers set to the hand to show 10 minutes past 11 o'clock  1 5/5  If cognitive concerns identified, deferred for follow up unless specifically addressed in assessment and plan.    Fall Risk Assessment    Has the patient had two or more falls in the last year or any fall with injury in the last year?  No  If fall risk identified, deferred for follow up unless specifically addressed in assessment and  plan.    Safety Assessment    Throw rugs on floor.  Yes  Handrails on all stairs.  Yes  Good lighting in all hallways.  Yes  Difficulty hearing.  No  Patient counseled about all safety risks that were identified.    Functional Assessment ADLs    Are there any barriers preventing you from cooking for yourself or meeting nutritional needs?  No.    Are there any barriers preventing you from driving safely or obtaining transportation?  No.    Are there any barriers preventing you from using a telephone or calling for help?  No.    Are there any barriers preventing you from shopping?  No.    Are there any barriers preventing you from taking care of your own finances?  No.    Are there any barriers preventing you from managing your medications?  No.    Are you currently engaging any exercise or physical activity?   .  Bike 5 days a week 40 min, dumb bells, stretching    Health Maintenance Summary                Annual Wellness Visit Overdue 1945     IMM ZOSTER VACCINE Overdue 7/28/2006     COLONOSCOPY Overdue 12/3/2016      Done 12/3/2013 AMB REFERRAL TO GI FOR COLONOSCOPY    IMM INFLUENZA Next Due 9/1/2017      Done 1/14/2016 Imm Admin: Influenza Vaccine Quad Inj (Preserved)    IMM DTaP/Tdap/Td Vaccine Next Due 5/23/2022      Done 5/23/2012 Imm Admin: Tdap Vaccine          Patient Care Team:  Lenora Drake M.D. as PCP - General (Internal Medicine)    Social History   Substance Use Topics   • Smoking status: Former Smoker -- 1.00 packs/day for 10 years     Quit date: 01/01/1986   • Smokeless tobacco: Never Used      Comment: light smoker in past, quit about 43 yrs old.   • Alcohol Use: 0.5 oz/week     1 Glasses of wine per week      Comment: wine every dinner     Family History   Problem Relation Age of Onset   • Cancer Mother 57     breast cancer     He  has a past medical history of Hyperlipidemia; Hypertension; Indigestion; Heart burn; Glaucoma; Arthritis; GERD (gastroesophageal reflux disease); and BPH (benign  "prostatic hyperplasia).   Past Surgical History   Procedure Laterality Date   • [place] by laparoscopy  8/27/2015     Procedure: ELBA BY LAPAROSCOPY;  Surgeon: Randall Ram M.D.;  Location: SURGERY [facility];  Service:    • Umbilical hernia repair  8/27/2015     Procedure: UMBILICAL HERNIA REPAIR;  Surgeon: Randall Ram M.D.;  Location: SURGERY [facility];  Service:    • Other abdominal surgery  1945     pyloric stenosis repair as an infant   • [place] by laparoscopy  2015   • Cystoscopy  7/15/2016     Procedure: CYSTOSCOPY;  Surgeon: Lew Moran M.D.;  Location: SURGERY [facility];  Service:    • Trans urethral resection bladder  7/15/2016     Procedure: TRANS URETHRAL RESECTION BLADDER Tumor;  Surgeon: Lew Moran M.D.;  Location: SURGERY [facility];  Service:    • Appendectomy       at age 6           Exam:     Blood pressure 138/82, pulse 79, temperature 37.2 °C (99 °F), resp. rate 16, height 1.765 m (5' 9.5\"), weight 107.4 kg (236 lb 12.4 oz), SpO2 96 %. Body mass index is 34.48 kg/(m^2).    Hearing ;good  Dentition; good  Alert, oriented in no acute distress.  Eye contact is good, speech goal directed, affect calm      Assessment and Plan. The following treatment and monitoring plan is recommended:    1. HTN/ hypokalemia; on hydrochlorothiazide 25 mg daily, metoprolol  mg daily,  potassium supplements.        2. GERD; responds to omeprazole 40 mg daily      3. BPH; clinically is stable on Flomax 0.4 mg daily      4. Dyslipidemia;on  lovastatin 20 mg daily at bedtime plus CoQ10 100 mg daily. Also on alternative supplements for elevated lipoprotein A.          Ref. Range  2/2/2017 12:20  7/18/2017 12:02    Lipoprotein A  Latest Ref Range: <=29 mg/dL  62 (H)  37 (H)        5. Increased BMI      6. Bladder cancer; status post cystoscopy and tumor resection.       7. Macrocytosis without anemia; patient denies excessive chronic drinking.     Ref. Range  7/8/2016 11:00  " 2/2/2017 12:20  7/18/2017 12:02    Hemoglobin  Latest Ref Range: 14.0-18.0 g/dL  15.9  15.1  15.4    Hematocrit  Latest Ref Range: 42.0-52.0 %  46.6  45.0  45.0    MCV  Latest Ref Range: 81.4-97.8 fL  100.9 (H)  101.8 (H)  97.8       Ref. Range 2/2/2017 12:20 7/18/2017 12:02   Vitamin B12 -True Cobalamin Latest Ref Range: 211-911 pg/mL 1107 (H) 1108 (H)   Folate -Folic Acid Latest Ref Range: >4.0 ng/mL >23.6        Patient is advised on preventive and supportive care, diet and lifestyle modification, daily walking ,exercise and weight loss. Continue current meds and monitor labs.                                   Services suggested:   Health Care Screening recommendations as per orders if indicated.  Referrals offered: PT/OT/Nutrition counseling/Behavioral Health/Smoking cessation as per orders if indicated.    Discussion today about general wellness and lifestyle habits:    · Prevent falls and reduce trip hazards; Cautioned about securing or removing rugs.  · Have a working fire alarm and carbon monoxide detector;   · Engage in regular physical activity and social activities       Follow-up: No Follow-up on file.

## 2017-08-22 ENCOUNTER — HOSPITAL ENCOUNTER (OUTPATIENT)
Facility: MEDICAL CENTER | Age: 72
End: 2017-08-22
Attending: UROLOGY
Payer: MEDICARE

## 2017-08-22 LAB
APPEARANCE UR: CLEAR
BILIRUB UR QL STRIP.AUTO: NEGATIVE
COLOR UR: YELLOW
GLUCOSE UR STRIP.AUTO-MCNC: NEGATIVE MG/DL
KETONES UR STRIP.AUTO-MCNC: ABNORMAL MG/DL
LEUKOCYTE ESTERASE UR QL STRIP.AUTO: NEGATIVE
MICRO URNS: ABNORMAL
NITRITE UR QL STRIP.AUTO: NEGATIVE
PH UR STRIP.AUTO: >=9 [PH]
PROT UR QL STRIP: NEGATIVE MG/DL
RBC UR QL AUTO: NEGATIVE
SP GR UR STRIP.AUTO: 1.02
UROBILINOGEN UR STRIP.AUTO-MCNC: 0.2 MG/DL

## 2017-08-22 PROCEDURE — 81003 URINALYSIS AUTO W/O SCOPE: CPT

## 2017-08-22 PROCEDURE — 87086 URINE CULTURE/COLONY COUNT: CPT

## 2017-08-25 LAB
BACTERIA UR CULT: NORMAL
SIGNIFICANT IND 70042: NORMAL
SOURCE SOURCE: NORMAL

## 2017-09-12 ENCOUNTER — TELEPHONE (OUTPATIENT)
Dept: MEDICAL GROUP | Facility: PHYSICIAN GROUP | Age: 72
End: 2017-09-12

## 2017-09-12 NOTE — TELEPHONE ENCOUNTER
Received a letter from [facility] stating that they have not been able to reach patient to schedule colonoscopy.  I spoke with patient and provided him with the phone number to [facility] to call them for appt.  Notice scanned to media.

## 2018-01-29 ENCOUNTER — APPOINTMENT (OUTPATIENT)
Dept: MEDICAL GROUP | Facility: PHYSICIAN GROUP | Age: 73
End: 2018-01-29
Payer: MEDICARE

## 2018-02-07 ENCOUNTER — OFFICE VISIT (OUTPATIENT)
Dept: MEDICAL GROUP | Facility: MEDICAL CENTER | Age: 73
End: 2018-02-07
Payer: MEDICARE

## 2018-02-07 VITALS
SYSTOLIC BLOOD PRESSURE: 130 MMHG | WEIGHT: 238 LBS | HEART RATE: 86 BPM | DIASTOLIC BLOOD PRESSURE: 72 MMHG | BODY MASS INDEX: 34.07 KG/M2 | HEIGHT: 70 IN | RESPIRATION RATE: 16 BRPM | OXYGEN SATURATION: 95 %

## 2018-02-07 DIAGNOSIS — E78.5 DYSLIPIDEMIA: ICD-10-CM

## 2018-02-07 DIAGNOSIS — N40.0 BENIGN PROSTATIC HYPERPLASIA WITHOUT LOWER URINARY TRACT SYMPTOMS: ICD-10-CM

## 2018-02-07 DIAGNOSIS — E87.6 HYPOKALEMIA: ICD-10-CM

## 2018-02-07 DIAGNOSIS — E66.9 OBESITY (BMI 30-39.9): ICD-10-CM

## 2018-02-07 DIAGNOSIS — I10 ESSENTIAL HYPERTENSION: ICD-10-CM

## 2018-02-07 DIAGNOSIS — K21.9 GASTROESOPHAGEAL REFLUX DISEASE WITHOUT ESOPHAGITIS: ICD-10-CM

## 2018-02-07 DIAGNOSIS — J30.89 CHRONIC NON-SEASONAL ALLERGIC RHINITIS, UNSPECIFIED TRIGGER: ICD-10-CM

## 2018-02-07 DIAGNOSIS — D41.4 BLADDER NEOPLASM OF UNCERTAIN MALIGNANT POTENTIAL: ICD-10-CM

## 2018-02-07 PROCEDURE — 99214 OFFICE O/P EST MOD 30 MIN: CPT | Performed by: NURSE PRACTITIONER

## 2018-02-07 RX ORDER — FLUTICASONE PROPIONATE 50 MCG
2 SPRAY, SUSPENSION (ML) NASAL DAILY
Qty: 16 G | Refills: 11 | Status: SHIPPED | OUTPATIENT
Start: 2018-02-07 | End: 2019-05-07 | Stop reason: SDUPTHER

## 2018-02-07 RX ORDER — LOVASTATIN 20 MG/1
20 TABLET ORAL DAILY
Qty: 90 TAB | Refills: 3 | Status: SHIPPED | OUTPATIENT
Start: 2018-02-07 | End: 2019-03-21

## 2018-02-07 RX ORDER — AZELASTINE 1 MG/ML
1 SPRAY, METERED NASAL 2 TIMES DAILY
Qty: 30 ML | Refills: 11 | Status: SHIPPED | OUTPATIENT
Start: 2018-02-07 | End: 2019-05-07 | Stop reason: SDUPTHER

## 2018-02-07 RX ORDER — POTASSIUM CHLORIDE 750 MG/1
20 TABLET, FILM COATED, EXTENDED RELEASE ORAL DAILY
Qty: 90 TAB | Refills: 3 | Status: SHIPPED | OUTPATIENT
Start: 2018-02-07 | End: 2018-07-11 | Stop reason: SDUPTHER

## 2018-02-07 RX ORDER — HYDROCHLOROTHIAZIDE 25 MG/1
25 TABLET ORAL
Qty: 90 TAB | Refills: 3 | Status: SHIPPED | OUTPATIENT
Start: 2018-02-07 | End: 2019-03-21 | Stop reason: SDUPTHER

## 2018-02-07 RX ORDER — TAMSULOSIN HYDROCHLORIDE 0.4 MG/1
0.4 CAPSULE ORAL
Qty: 90 CAP | Refills: 3 | Status: SHIPPED | OUTPATIENT
Start: 2018-02-07 | End: 2019-02-01 | Stop reason: SDUPTHER

## 2018-02-07 RX ORDER — OMEPRAZOLE 40 MG/1
40 CAPSULE, DELAYED RELEASE ORAL DAILY
Qty: 90 CAP | Refills: 3 | Status: SHIPPED | OUTPATIENT
Start: 2018-02-07 | End: 2019-03-21 | Stop reason: SDUPTHER

## 2018-02-07 RX ORDER — METOPROLOL SUCCINATE 100 MG/1
200 TABLET, EXTENDED RELEASE ORAL DAILY
Qty: 180 TAB | Refills: 3 | Status: SHIPPED | OUTPATIENT
Start: 2018-02-07 | End: 2019-03-21 | Stop reason: SDUPTHER

## 2018-02-07 NOTE — PROGRESS NOTES
Subjective:      Juma Stern is a 72 y.o. male who presents with Establish Care and Medication Refill (metropolol, omeprzole, hydrocholothizide , lovastatin, tamsulosin )         CC: This is a former patient of Dr. Drake here to establish care in need of medications for high cholesterol, hypertension, acid reflux, dyslipidemia, hypokalemia, and issues with allergies.    HPI Juma Stern      1. Essential hypertension  Patient currently uses 25 mg of HCTZ and 200 mg of metoprolol daily and states this usually keeps his blood pressure well controlled. It does not appear he has had bradycardia.    2. Gastroesophageal reflux disease without esophagitis  Patient takes omeprazole 40 mg daily and states this keeps his acid reflux in check.    3. Benign prostatic hyperplasia without lower urinary tract symptoms  Patient has history of BPH and bladder neoplasm for which he follows with urology. He states he has a cystoscopy scheduled soon. He takes Flomax.    4. Dyslipidemia  Patient currently on low-dose lovastatin. He reports no side effects.    5. Bladder neoplasm of uncertain malignant potential  Patient follows with urology on a regular basis.    6. Hypokalemia  Patient uses 20 mEq of potassium twice a day. When questioned on his chart showing it once a day, he states he has been using it twice a day for a while.    7. Chronic non-seasonal allergic rhinitis, unspecified trigger  Patient states he tends to have issues with rhinorrhea and sinus congestion since moving to [place]. He is wondering if there is something safe he can use for this.    Social History   Substance Use Topics   • Smoking status: Former Smoker     Packs/day: 1.00     Years: 10.00     Quit date: 1/1/1986   • Smokeless tobacco: Never Used      Comment: light smoker in past, quit about 43 yrs old.   • Alcohol use 0.5 oz/week     1 Glasses of wine per week      Comment: wine every dinner     Current Outpatient Prescriptions  "  Medication Sig Dispense Refill   • tamsulosin (FLOMAX) 0.4 MG capsule Take 1 Cap by mouth ONE-HALF HOUR AFTER BREAKFAST. 90 Cap 3   • hydroCHLOROthiazide (HYDRODIURIL) 25 MG Tab Take 1 Tab by mouth every day. 90 Tab 3   • metoprolol SR (TOPROL XL) 100 MG TABLET SR 24 HR Take 2 Tabs by mouth every day. 180 Tab 3   • omeprazole (PRILOSEC) 40 MG delayed-release capsule Take 1 Cap by mouth every day. 90 Cap 3   • lovastatin (MEVACOR) 20 MG Tab Take 1 Tab by mouth every day. 90 Tab 3   • potassium chloride ER (KLOR-CON) 10 MEQ tablet Take 2 Tabs by mouth every day. 90 Tab 3   • fluticasone (FLONASE) 50 MCG/ACT nasal spray Spray 2 Sprays in nose every day. 16 g 11   • azelastine (ASTELIN) 137 MCG/SPRAY nasal spray [place] 1 Spray in nose 2 times a day. 30 mL 11   • Coenzyme Q10 (CO Q10) 100 MG Cap Take 300 mg by mouth every day.     • Docusate Calcium (STOOL SOFTENER PO) Take 3 Tabs by mouth every day.     • Cholecalciferol (VITAMIN D) 2000 UNITS CAPS Take  by mouth.     • Calcium Carbonate-Vit D-Min (QC CALCIUM/MINERALS/VITAMIN D PO) Take  by mouth.     • MULTIPLE VITAMINS PO Take  by mouth.     • niacin 500 MG TABS Take 500 mg by mouth every day.     • Omega-3 Fatty Acids (FISH OIL) 1200 MG CAPS Take  by mouth.       No current facility-administered medications for this visit.      Past Medical History:   Diagnosis Date   • Arthritis     lower back   • BPH (benign prostatic hyperplasia)    • GERD (gastroesophageal reflux disease)    • Glaucoma     in right eye, no drops   • Heart burn    • Hyperlipidemia    • Hypertension     well controlled on meds   • Indigestion     pt on prilosec     Family History   Problem Relation Age of Onset   • Cancer Mother 57     breast cancer   • No Known Problems Father        Review of Systems   HENT: Positive for congestion.    All other systems reviewed and are negative.         Objective:     /72   Pulse 86   Resp 16   Ht 1.778 m (5' 10\")   Wt 108 kg (238 lb)   SpO2 95%   " BMI 34.15 kg/m²      Physical Exam   Constitutional: He is oriented to person, place, and time. He appears well-developed and well-nourished. No distress.   HENT:   Head: Normocephalic and atraumatic.   Right Ear: External ear normal.   Left Ear: External ear normal.   Nose: Nose normal.   Mouth/Throat: Oropharynx is clear and moist.   Eyes: Conjunctivae are normal. Right eye exhibits no discharge. Left eye exhibits no discharge.   Neck: Normal range of motion. Neck supple. No tracheal deviation present. No thyromegaly present.   Cardiovascular: Normal rate, regular rhythm and normal heart sounds.    No murmur heard.  Pulmonary/Chest: Effort normal and breath sounds normal. No respiratory distress. He has no wheezes. He has no rales.   Lymphadenopathy:     He has no cervical adenopathy.   Neurological: He is alert and oriented to person, place, and time. Coordination normal.   Skin: Skin is warm and dry. No rash noted. He is not diaphoretic. No erythema.   Psychiatric: He has a normal mood and affect. His behavior is normal. Judgment and thought content normal.   Nursing note and vitals reviewed.              Assessment/Plan:     1. Essential hypertension  I have refilled patient's medications and advised lab work in the next few weeks.  - COMP METABOLIC PANEL; Future  - CBC WITHOUT DIFFERENTIAL; Future  - TSH; Future  - hydroCHLOROthiazide (HYDRODIURIL) 25 MG Tab; Take 1 Tab by mouth every day.  Dispense: 90 Tab; Refill: 3  - metoprolol SR (TOPROL XL) 100 MG TABLET SR 24 HR; Take 2 Tabs by mouth every day.  Dispense: 180 Tab; Refill: 3    2. Gastroesophageal reflux disease without esophagitis  Patient currently happy with this dosage.  - omeprazole (PRILOSEC) 40 MG delayed-release capsule; Take 1 Cap by mouth every day.  Dispense: 90 Cap; Refill: 3    3. Benign prostatic hyperplasia without lower urinary tract symptoms  Patient on Flomax through urology.    4. Dyslipidemia  I will check a cholesterol panel and  refill his lovastatin.  - LIPID PROFILE; Future  - lovastatin (MEVACOR) 20 MG Tab; Take 1 Tab by mouth every day.  Dispense: 90 Tab; Refill: 3    5. Bladder neoplasm of uncertain malignant potential  Patient states he has a cystoscopy scheduled in the next month.    6. Hypokalemia  Patient currently taking his medication twice a day and I will check potassium to see if he needs to continue at this dosage. He is on HCTZ.  - potassium chloride ER (KLOR-CON) 10 MEQ tablet; Take 2 Tabs by mouth every day.  Dispense: 90 Tab; Refill: 3    7. Chronic non-seasonal allergic rhinitis, unspecified trigger  I will have patient first try Flonase nasal spray for a few weeks. If this does not provide adequate control advised him to add Astelin nasal spray. Side effects of medicines were discussed.  - fluticasone (FLONASE) 50 MCG/ACT nasal spray; Spray 2 Sprays in nose every day.  Dispense: 16 g; Refill: 11  - azelastine (ASTELIN) 137 MCG/SPRAY nasal spray; Spray 1 Spray in nose 2 times a day.  Dispense: 30 mL; Refill: 11    8. Obesity (BMI 30-39.9)    - Patient identified as having weight management issue.  Appropriate orders and counseling given.

## 2018-02-20 ENCOUNTER — HOSPITAL ENCOUNTER (OUTPATIENT)
Dept: LAB | Facility: MEDICAL CENTER | Age: 73
End: 2018-02-20
Attending: NURSE PRACTITIONER
Payer: MEDICARE

## 2018-02-20 DIAGNOSIS — I10 ESSENTIAL HYPERTENSION: ICD-10-CM

## 2018-02-20 DIAGNOSIS — E78.5 DYSLIPIDEMIA: ICD-10-CM

## 2018-02-20 LAB
ALBUMIN SERPL BCP-MCNC: 3.9 G/DL (ref 3.2–4.9)
ALBUMIN/GLOB SERPL: 1.2 G/DL
ALP SERPL-CCNC: 50 U/L (ref 30–99)
ALT SERPL-CCNC: 26 U/L (ref 2–50)
ANION GAP SERPL CALC-SCNC: 8 MMOL/L (ref 0–11.9)
AST SERPL-CCNC: 18 U/L (ref 12–45)
BILIRUB SERPL-MCNC: 0.9 MG/DL (ref 0.1–1.5)
BUN SERPL-MCNC: 20 MG/DL (ref 8–22)
CALCIUM SERPL-MCNC: 9.5 MG/DL (ref 8.5–10.5)
CHLORIDE SERPL-SCNC: 102 MMOL/L (ref 96–112)
CHOLEST SERPL-MCNC: 162 MG/DL (ref 100–199)
CO2 SERPL-SCNC: 29 MMOL/L (ref 20–33)
CREAT SERPL-MCNC: 0.76 MG/DL (ref 0.5–1.4)
ERYTHROCYTE [DISTWIDTH] IN BLOOD BY AUTOMATED COUNT: 43.3 FL (ref 35.9–50)
GLOBULIN SER CALC-MCNC: 3.2 G/DL (ref 1.9–3.5)
GLUCOSE SERPL-MCNC: 96 MG/DL (ref 65–99)
HCT VFR BLD AUTO: 44.4 % (ref 42–52)
HDLC SERPL-MCNC: 45 MG/DL
HGB BLD-MCNC: 15.1 G/DL (ref 14–18)
LDLC SERPL CALC-MCNC: 93 MG/DL
MCH RBC QN AUTO: 34.8 PG (ref 27–33)
MCHC RBC AUTO-ENTMCNC: 34 G/DL (ref 33.7–35.3)
MCV RBC AUTO: 102.3 FL (ref 81.4–97.8)
PLATELET # BLD AUTO: 240 K/UL (ref 164–446)
PMV BLD AUTO: 10.1 FL (ref 9–12.9)
POTASSIUM SERPL-SCNC: 3.5 MMOL/L (ref 3.6–5.5)
PROT SERPL-MCNC: 7.1 G/DL (ref 6–8.2)
RBC # BLD AUTO: 4.34 M/UL (ref 4.7–6.1)
SODIUM SERPL-SCNC: 139 MMOL/L (ref 135–145)
TRIGL SERPL-MCNC: 118 MG/DL (ref 0–149)
TSH SERPL DL<=0.005 MIU/L-ACNC: 1.49 UIU/ML (ref 0.38–5.33)
WBC # BLD AUTO: 7.4 K/UL (ref 4.8–10.8)

## 2018-02-20 PROCEDURE — 36415 COLL VENOUS BLD VENIPUNCTURE: CPT

## 2018-02-20 PROCEDURE — 80053 COMPREHEN METABOLIC PANEL: CPT

## 2018-02-20 PROCEDURE — 80061 LIPID PANEL: CPT

## 2018-02-20 PROCEDURE — 84443 ASSAY THYROID STIM HORMONE: CPT

## 2018-02-20 PROCEDURE — 85027 COMPLETE CBC AUTOMATED: CPT

## 2018-03-19 ENCOUNTER — HOSPITAL ENCOUNTER (OUTPATIENT)
Facility: MEDICAL CENTER | Age: 73
End: 2018-03-19
Attending: UROLOGY
Payer: MEDICARE

## 2018-03-19 LAB — CYTOLOGY REG CYTOL: NORMAL

## 2018-03-19 PROCEDURE — 88112 CYTOPATH CELL ENHANCE TECH: CPT

## 2018-05-25 ENCOUNTER — PATIENT OUTREACH (OUTPATIENT)
Dept: HEALTH INFORMATION MANAGEMENT | Facility: OTHER | Age: 73
End: 2018-05-25

## 2018-07-11 DIAGNOSIS — E87.6 HYPOKALEMIA: ICD-10-CM

## 2018-07-11 RX ORDER — POTASSIUM CHLORIDE 750 MG/1
20 TABLET, FILM COATED, EXTENDED RELEASE ORAL DAILY
Qty: 90 TAB | Refills: 3 | Status: SHIPPED | OUTPATIENT
Start: 2018-07-11 | End: 2019-03-21

## 2019-02-01 RX ORDER — TAMSULOSIN HYDROCHLORIDE 0.4 MG/1
CAPSULE ORAL
Qty: 90 CAP | Refills: 0 | Status: SHIPPED | OUTPATIENT
Start: 2019-02-01 | End: 2019-03-21 | Stop reason: SDUPTHER

## 2019-03-12 DIAGNOSIS — I10 ESSENTIAL HYPERTENSION: ICD-10-CM

## 2019-03-12 DIAGNOSIS — K21.9 GASTROESOPHAGEAL REFLUX DISEASE WITHOUT ESOPHAGITIS: ICD-10-CM

## 2019-03-12 DIAGNOSIS — E78.5 DYSLIPIDEMIA: ICD-10-CM

## 2019-03-18 ENCOUNTER — HOSPITAL ENCOUNTER (OUTPATIENT)
Dept: LAB | Facility: MEDICAL CENTER | Age: 74
End: 2019-03-18
Attending: NURSE PRACTITIONER
Payer: MEDICARE

## 2019-03-18 DIAGNOSIS — K21.9 GASTROESOPHAGEAL REFLUX DISEASE WITHOUT ESOPHAGITIS: ICD-10-CM

## 2019-03-18 DIAGNOSIS — I10 ESSENTIAL HYPERTENSION: ICD-10-CM

## 2019-03-18 DIAGNOSIS — E78.5 DYSLIPIDEMIA: ICD-10-CM

## 2019-03-18 LAB
ALBUMIN SERPL BCP-MCNC: 4.1 G/DL (ref 3.2–4.9)
ALBUMIN/GLOB SERPL: 1.4 G/DL
ALP SERPL-CCNC: 66 U/L (ref 30–99)
ALT SERPL-CCNC: 36 U/L (ref 2–50)
ANION GAP SERPL CALC-SCNC: 12 MMOL/L (ref 0–11.9)
AST SERPL-CCNC: 26 U/L (ref 12–45)
BILIRUB SERPL-MCNC: 0.9 MG/DL (ref 0.1–1.5)
BUN SERPL-MCNC: 15 MG/DL (ref 8–22)
CALCIUM SERPL-MCNC: 10.1 MG/DL (ref 8.5–10.5)
CHLORIDE SERPL-SCNC: 102 MMOL/L (ref 96–112)
CHOLEST SERPL-MCNC: 179 MG/DL (ref 100–199)
CO2 SERPL-SCNC: 28 MMOL/L (ref 20–33)
CREAT SERPL-MCNC: 0.68 MG/DL (ref 0.5–1.4)
ERYTHROCYTE [DISTWIDTH] IN BLOOD BY AUTOMATED COUNT: 43.5 FL (ref 35.9–50)
FASTING STATUS PATIENT QL REPORTED: NORMAL
GLOBULIN SER CALC-MCNC: 3 G/DL (ref 1.9–3.5)
GLUCOSE SERPL-MCNC: 103 MG/DL (ref 65–99)
HCT VFR BLD AUTO: 45.5 % (ref 42–52)
HDLC SERPL-MCNC: 44 MG/DL
HGB BLD-MCNC: 15.2 G/DL (ref 14–18)
LDLC SERPL CALC-MCNC: 117 MG/DL
MCH RBC QN AUTO: 34.2 PG (ref 27–33)
MCHC RBC AUTO-ENTMCNC: 33.4 G/DL (ref 33.7–35.3)
MCV RBC AUTO: 102.2 FL (ref 81.4–97.8)
PLATELET # BLD AUTO: 280 K/UL (ref 164–446)
PMV BLD AUTO: 9.7 FL (ref 9–12.9)
POTASSIUM SERPL-SCNC: 3.4 MMOL/L (ref 3.6–5.5)
PROT SERPL-MCNC: 7.1 G/DL (ref 6–8.2)
RBC # BLD AUTO: 4.45 M/UL (ref 4.7–6.1)
SODIUM SERPL-SCNC: 142 MMOL/L (ref 135–145)
TRIGL SERPL-MCNC: 92 MG/DL (ref 0–149)
TSH SERPL DL<=0.005 MIU/L-ACNC: 1.91 UIU/ML (ref 0.38–5.33)
WBC # BLD AUTO: 7.8 K/UL (ref 4.8–10.8)

## 2019-03-18 PROCEDURE — 80061 LIPID PANEL: CPT

## 2019-03-18 PROCEDURE — 85027 COMPLETE CBC AUTOMATED: CPT

## 2019-03-18 PROCEDURE — 84443 ASSAY THYROID STIM HORMONE: CPT

## 2019-03-18 PROCEDURE — 80053 COMPREHEN METABOLIC PANEL: CPT

## 2019-03-18 PROCEDURE — 36415 COLL VENOUS BLD VENIPUNCTURE: CPT

## 2019-03-21 ENCOUNTER — OFFICE VISIT (OUTPATIENT)
Dept: MEDICAL GROUP | Facility: MEDICAL CENTER | Age: 74
End: 2019-03-21
Payer: MEDICARE

## 2019-03-21 VITALS
WEIGHT: 224 LBS | HEART RATE: 102 BPM | HEIGHT: 70 IN | DIASTOLIC BLOOD PRESSURE: 76 MMHG | BODY MASS INDEX: 32.07 KG/M2 | SYSTOLIC BLOOD PRESSURE: 132 MMHG | RESPIRATION RATE: 16 BRPM | OXYGEN SATURATION: 97 %

## 2019-03-21 DIAGNOSIS — E66.9 OBESITY (BMI 30-39.9): ICD-10-CM

## 2019-03-21 DIAGNOSIS — Z00.00 MEDICARE ANNUAL WELLNESS VISIT, SUBSEQUENT: ICD-10-CM

## 2019-03-21 DIAGNOSIS — L98.9 SKIN LESION OF BACK: ICD-10-CM

## 2019-03-21 DIAGNOSIS — I10 ESSENTIAL HYPERTENSION: ICD-10-CM

## 2019-03-21 DIAGNOSIS — D41.4 BLADDER NEOPLASM OF UNCERTAIN MALIGNANT POTENTIAL: ICD-10-CM

## 2019-03-21 DIAGNOSIS — E87.6 HYPOKALEMIA: ICD-10-CM

## 2019-03-21 DIAGNOSIS — N40.0 BENIGN PROSTATIC HYPERPLASIA WITHOUT LOWER URINARY TRACT SYMPTOMS: ICD-10-CM

## 2019-03-21 DIAGNOSIS — E78.5 DYSLIPIDEMIA: ICD-10-CM

## 2019-03-21 DIAGNOSIS — K21.9 GASTROESOPHAGEAL REFLUX DISEASE WITHOUT ESOPHAGITIS: ICD-10-CM

## 2019-03-21 PROCEDURE — G0439 PPPS, SUBSEQ VISIT: HCPCS | Performed by: NURSE PRACTITIONER

## 2019-03-21 PROCEDURE — 99213 OFFICE O/P EST LOW 20 MIN: CPT | Mod: 25 | Performed by: NURSE PRACTITIONER

## 2019-03-21 PROCEDURE — 8041 PR SCP AHA: Performed by: NURSE PRACTITIONER

## 2019-03-21 RX ORDER — METOPROLOL SUCCINATE 100 MG/1
200 TABLET, EXTENDED RELEASE ORAL DAILY
Qty: 180 TAB | Refills: 3 | Status: SHIPPED | OUTPATIENT
Start: 2019-03-21 | End: 2020-05-10

## 2019-03-21 RX ORDER — HYDROCHLOROTHIAZIDE 25 MG/1
25 TABLET ORAL
Qty: 90 TAB | Refills: 3 | Status: SHIPPED | OUTPATIENT
Start: 2019-03-21 | End: 2020-05-11 | Stop reason: SDUPTHER

## 2019-03-21 RX ORDER — TAMSULOSIN HYDROCHLORIDE 0.4 MG/1
0.4 CAPSULE ORAL DAILY
Qty: 90 CAP | Refills: 3 | Status: SHIPPED | OUTPATIENT
Start: 2019-03-21 | End: 2020-05-11 | Stop reason: SDUPTHER

## 2019-03-21 RX ORDER — ATORVASTATIN CALCIUM 20 MG/1
20 TABLET, FILM COATED ORAL DAILY
Qty: 90 TAB | Refills: 3 | Status: SHIPPED | OUTPATIENT
Start: 2019-03-21 | End: 2019-04-16 | Stop reason: SDUPTHER

## 2019-03-21 RX ORDER — POTASSIUM CHLORIDE 20 MEQ/1
20 TABLET, EXTENDED RELEASE ORAL DAILY
Qty: 90 TAB | Refills: 3 | Status: SHIPPED | OUTPATIENT
Start: 2019-03-21 | End: 2020-05-10

## 2019-03-21 RX ORDER — OMEPRAZOLE 40 MG/1
40 CAPSULE, DELAYED RELEASE ORAL DAILY
Qty: 90 CAP | Refills: 3 | Status: SHIPPED | OUTPATIENT
Start: 2019-03-21 | End: 2020-05-11 | Stop reason: SDUPTHER

## 2019-03-21 ASSESSMENT — ENCOUNTER SYMPTOMS: GENERAL WELL-BEING: EXCELLENT

## 2019-03-21 ASSESSMENT — PATIENT HEALTH QUESTIONNAIRE - PHQ9: CLINICAL INTERPRETATION OF PHQ2 SCORE: 0

## 2019-03-21 ASSESSMENT — ACTIVITIES OF DAILY LIVING (ADL): BATHING_REQUIRES_ASSISTANCE: 0

## 2019-03-21 NOTE — PROGRESS NOTES
CC: Patient is here today for annual Medicare wellness visit, skin concerns, and review of lab work related to his cholesterol and potassium.    HPI:   Juma presents today with the following.    1. Medicare annual wellness visit, subsequent  Screening performed below.    2. Hypokalemia  Patient currently on 2 of the 10 mEq potassium pills daily.  Potassium is still slightly low and he is on HCTZ 25 mg daily.    3. Skin lesion of back  Patient has a darkened area on his right upper back which he would like to have looked at.  He thinks it is been there for a while and does not know if it is grown in size.    4. Essential hypertension  Blood pressure is 132/76 today with his hydrochlorothiazide and potassium.    5. Gastroesophageal reflux disease without esophagitis  Patient states his acid reflux is well controlled as long as he takes his omeprazole and does follow with gastroenterology.    6. Benign prostatic hyperplasia without lower urinary tract symptoms  Patient finds Flomax is been helpful and takes a daily.    7. Dyslipidemia  Cholesterol levels have been higher than expected but he came to us on low-dose lovastatin and would be willing to switch to a different statin.    8. Bladder neoplasm of uncertain malignant potential  Patient states he follows with urology at least yearly and there is been no recurrence of cancer.    9. Obesity (BMI 30-39.9)  Weight continues to be elevated.    Annual Health Assessment Questions:    1.  Are you currently engaging in any exercise or physical activity? Yes    2.  How would you describe your mood or emotional well-being today? good    3.  Have you had any falls in the last year? No    4.  Have you noticed any problems with your balance or had difficulty walking? No    5.  In the last six months have you experienced any leakage of urine? No    6. DPA/Advanced Directive: Patient does not have an Advanced Directive.  A packet and workshop information was given on Advanced  Directives.  Depression Screening    Little interest or pleasure in doing things?  0 - not at all  Feeling down, depressed , or hopeless? 0 - not at all  Patient Health Questionnaire Score: 0     If depressive symptoms identified deferred to follow up visit unless specifically addressed in assessment and plan.    Interpretation of PHQ-9 Total Score   Score Severity   1-4 No Depression   5-9 Mild Depression   10-14 Moderate Depression   15-19 Moderately Severe Depression   20-27 Severe Depression    Screening for Cognitive Impairment    Three Minute Recall (leader, season, table) 3/3    Raj clock face with all 12 numbers and set the hands to show 10 past 11.  Yes    Cognitive concerns identified deferred for follow up unless specifically addressed in assessment and plan.    Fall Risk Assessment    Has the patient had two or more falls in the last year or any fall with injury in the last year?  No    Safety Assessment    Throw rugs on floor.  Yes  Handrails on all stairs.  Yes  Good lighting in all hallways.  Yes  Difficulty hearing.  No  Patient counseled about all safety risks that were identified.    Functional Assessment ADLs    Are there any barriers preventing you from cooking for yourself or meeting nutritional needs?  No.    Are there any barriers preventing you from driving safely or obtaining transportation?  No.    Are there any barriers preventing you from using a telephone or calling for help?  No.    Are there any barriers preventing you from shopping?  No.    Are there any barriers preventing you from taking care of your own finances?  No.    Are there any barriers preventing you from managing your medications?  No.    Are there any barriers preventing you from showering, bathing or dressing yourself?  No.    Are you currently engaging in any exercise or physical activity?  Yes.     What is your perception of your health?  Excellent.      Health Maintenance Summary                Annual Wellness Visit  Overdue 1945     IMM INFLUENZA Postponed 3/2/2022 Originally 9/1/2018. Patient Refused     Done 1/14/2016 Imm Admin: Influenza Vaccine Quad Inj (Preserved)    IMM ZOSTER VACCINES Postponed 3/9/2022 Originally 7/28/1996. System: vaccine not available, other system reasons    COLONOSCOPY Next Due 11/3/2020      Done 11/3/2017 REFERRAL TO GI FOR COLONOSCOPY     Patient has more history with this topic...    IMM DTaP/Tdap/Td Vaccine Next Due 5/23/2022      Done 5/23/2012 Imm Admin: Tdap Vaccine          Patient Care Team:  LADARIUS Sears as PCP - General (Family Medicine)  [facility]  [facility]          Patient Active Problem List    Diagnosis Date Noted   • Dyslipidemia 02/07/2018   • Obesity (BMI 30-39.9) 02/09/2017   • Bladder neoplasm of uncertain malignant potential 07/15/2016   • Essential hypertension 01/15/2015   • Gastroesophageal reflux disease without esophagitis 01/15/2015   • Benign prostatic hyperplasia without lower urinary tract symptoms 01/15/2015       Current Outpatient Prescriptions   Medication Sig Dispense Refill   • tamsulosin (FLOMAX) 0.4 MG capsule Take 1 Cap by mouth every day. ONE-HALF  HOUR AFTER BREAKFAST. 90 Cap 3   • potassium chloride SA (KDUR) 20 MEQ Tab CR Take 1 Tab by mouth every day. 90 Tab 3   • hydroCHLOROthiazide (HYDRODIURIL) 25 MG Tab Take 1 Tab by mouth every day. 90 Tab 3   • metoprolol SR (TOPROL XL) 100 MG TABLET SR 24 HR Take 2 Tabs by mouth every day. 180 Tab 3   • omeprazole (PRILOSEC) 40 MG delayed-release capsule Take 1 Cap by mouth every day. 90 Cap 3   • atorvastatin (LIPITOR) 20 MG Tab Take 1 Tab by mouth every day. 90 Tab 3   • potassium chloride ER (KLOR-CON) 10 MEQ tablet Take 2 Tabs by mouth every day. 90 Tab 3   • fluticasone (FLONASE) 50 MCG/ACT nasal spray Spray 2 Sprays in nose every day. 16 g 11   • azelastine (ASTELIN) 137 MCG/SPRAY nasal spray [place] 1 Spray in nose 2 times a day. 30 mL 11   • Coenzyme Q10 (CO  "Q10) 100 MG Cap Take 300 mg by mouth every day.     • Docusate Calcium (STOOL SOFTENER PO) Take 3 Tabs by mouth every day.     • Cholecalciferol (VITAMIN D) 2000 UNITS CAPS Take  by mouth.     • Calcium Carbonate-Vit D-Min (QC CALCIUM/MINERALS/VITAMIN D PO) Take  by mouth.     • MULTIPLE VITAMINS PO Take  by mouth.     • niacin 500 MG TABS Take 500 mg by mouth every day.     • Omega-3 Fatty Acids (FISH OIL) 1200 MG CAPS Take  by mouth.       No current facility-administered medications for this visit.          Allergies as of 03/21/2019   • (No Known Allergies)        ROS: As per HPI.    /76 (BP Location: Left arm, Patient Position: Sitting, BP Cuff Size: Adult)   Pulse (!) 102   Resp 16   Ht 1.778 m (5' 10\")   Wt 101.6 kg (224 lb)   SpO2 97%   BMI 32.14 kg/m²     Physical Exam:  Gen:         Alert and oriented, No apparent distress.  Neck:        No Lymphadenopathy or Bruits.  Lungs:     Clear to auscultation bilaterally  CV:          Regular rate and rhythm. No murmurs, rubs or gallops.  Abd:         Soft non tender, non distended. Normal active bowel sounds.  No  Hepatosplenomegaly, No pulsatile masses.                   Ext:          No clubbing, cyanosis, edema.  Small darkened area on right upper back.      Assessment and Plan.   73 y.o. male with the following issues.    1. Medicare annual wellness visit, subsequent  Annual wellness topics discussed, review of chronic medical problems completed    - Initial Annual Wellness Visit - Includes PPPS ()    2. Hypokalemia  I will switch patient from the 2 pills a day of the lower dose potassium to the 20 mEq once a day.  I advised him to try to increase potassium intake through diet and if he continues to show low in the future, I would recommend we decrease his HCTZ dosage down.  He would need some kind of replacement for this however.  - potassium chloride SA (KDUR) 20 MEQ Tab CR; Take 1 Tab by mouth every day.  Dispense: 90 Tab; Refill: 3  - Comp " Metabolic Panel; Future    3. Skin lesion of back  I will get dermatology opinion as to whether biopsy needed.  - REFERRAL TO DERMATOLOGY    4. Essential hypertension  I will continue his medications the same for now but may need to switch off HCTZ in the future if his potassium stays low.  - hydroCHLOROthiazide (HYDRODIURIL) 25 MG Tab; Take 1 Tab by mouth every day.  Dispense: 90 Tab; Refill: 3  - metoprolol SR (TOPROL XL) 100 MG TABLET SR 24 HR; Take 2 Tabs by mouth every day.  Dispense: 180 Tab; Refill: 3  - Comp Metabolic Panel; Future  - TSH; Future    5. Gastroesophageal reflux disease without esophagitis  Patient currently happy with medication which helps with acid reflux.  - omeprazole (PRILOSEC) 40 MG delayed-release capsule; Take 1 Cap by mouth every day.  Dispense: 90 Cap; Refill: 3    6. Benign prostatic hyperplasia without lower urinary tract symptoms  Patient to continue with Flomax which helps urination.  - tamsulosin (FLOMAX) 0.4 MG capsule; Take 1 Cap by mouth every day. ONE-HALF  HOUR AFTER BREAKFAST.  Dispense: 90 Cap; Refill: 3    7. Dyslipidemia  I discussed options with patient and I would like to see his LDL closer to 70 so we will switch him to atorvastatin.  He will stop the medicine if he develops myalgias or other side effects.  He will do lab work in 3 months.  - atorvastatin (LIPITOR) 20 MG Tab; Take 1 Tab by mouth every day.  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    9. Obesity (BMI 30-39.9)  Weight unchanged and he does not want to see the dietitian.

## 2019-04-16 DIAGNOSIS — E78.5 DYSLIPIDEMIA: ICD-10-CM

## 2019-04-16 RX ORDER — ATORVASTATIN CALCIUM 20 MG/1
20 TABLET, FILM COATED ORAL DAILY
Qty: 100 TAB | Refills: 3 | Status: SHIPPED | OUTPATIENT
Start: 2019-04-16 | End: 2019-12-10 | Stop reason: SDUPTHER

## 2019-05-07 DIAGNOSIS — J30.89 CHRONIC NON-SEASONAL ALLERGIC RHINITIS: ICD-10-CM

## 2019-05-07 RX ORDER — AZELASTINE 1 MG/ML
1 SPRAY, METERED NASAL 2 TIMES DAILY
Qty: 30 ML | Refills: 11 | Status: SHIPPED | OUTPATIENT
Start: 2019-05-07 | End: 2020-06-29 | Stop reason: SDUPTHER

## 2019-05-07 RX ORDER — FLUTICASONE PROPIONATE 50 MCG
SPRAY, SUSPENSION (ML) NASAL
Qty: 1 BOTTLE | Refills: 3 | Status: SHIPPED | OUTPATIENT
Start: 2019-05-07 | End: 2020-01-08

## 2019-05-23 ENCOUNTER — HOSPITAL ENCOUNTER (OUTPATIENT)
Facility: MEDICAL CENTER | Age: 74
End: 2019-05-23
Attending: DERMATOLOGY
Payer: MEDICARE

## 2019-05-23 ENCOUNTER — OFFICE VISIT (OUTPATIENT)
Dept: DERMATOLOGY | Facility: IMAGING CENTER | Age: 74
End: 2019-05-23
Payer: MEDICARE

## 2019-05-23 VITALS — BODY MASS INDEX: 32.07 KG/M2 | TEMPERATURE: 98.8 F | HEIGHT: 70 IN | WEIGHT: 224 LBS

## 2019-05-23 DIAGNOSIS — D48.5 NEOPLASM OF UNCERTAIN BEHAVIOR OF SKIN: ICD-10-CM

## 2019-05-23 PROCEDURE — 11102 TANGNTL BX SKIN SINGLE LES: CPT | Performed by: DERMATOLOGY

## 2019-05-23 PROCEDURE — 88305 TISSUE EXAM BY PATHOLOGIST: CPT

## 2019-05-23 ASSESSMENT — ENCOUNTER SYMPTOMS
CHILLS: 0
FEVER: 0

## 2019-05-23 NOTE — PROGRESS NOTES
Dermatology New Patient Visit    Chief Complaint   Patient presents with   • Skin Lesion       Subjective:     HPI:   Juma Stern is a 73 y.o. male presenting for    Lesion on the neck.    HPI/location: back of neck, small dark spot  Time present: noted 5 months ago  Painful lesion: bothersome at one point  Itching lesion: No  Enlarging lesion: No - took a picture at onset, and does not believe that it has changed  Anything make it better or worse?n/a    History of skin cancer: No  History of precancers/actinic keratoses: No  History of biopsies:No  History of blistering/severe sunburns:Yes, childhood  Family history of skin cancer:Yes, Details: father unknown type  Family history of atypical moles:No            Past Medical History:   Diagnosis Date   • Arthritis     lower back   • BPH (benign prostatic hyperplasia)    • GERD (gastroesophageal reflux disease)    • Glaucoma     in right eye, no drops   • Heart burn    • Hyperlipidemia    • Hypertension     well controlled on meds   • Indigestion     pt on prilosec       Current Outpatient Prescriptions on File Prior to Visit   Medication Sig Dispense Refill   • fluticasone (FLONASE) 50 MCG/ACT nasal spray SHAKE LIQUID AND USE 2 SPRAYS IN EACH NOSTRIL EVERY DAY 1 Bottle 3   • azelastine (ASTELIN) 137 MCG/SPRAY nasal spray [place] 1 Spray in nose 2 times a day. 30 mL 11   • atorvastatin (LIPITOR) 20 MG Tab Take 1 Tab by mouth every day. 100 Tab 3   • tamsulosin (FLOMAX) 0.4 MG capsule Take 1 Cap by mouth every day. ONE-HALF  HOUR AFTER BREAKFAST. 90 Cap 3   • potassium chloride SA (KDUR) 20 MEQ Tab CR Take 1 Tab by mouth every day. 90 Tab 3   • hydroCHLOROthiazide (HYDRODIURIL) 25 MG Tab Take 1 Tab by mouth every day. 90 Tab 3   • metoprolol SR (TOPROL XL) 100 MG TABLET SR 24 HR Take 2 Tabs by mouth every day. 180 Tab 3   • omeprazole (PRILOSEC) 40 MG delayed-release capsule Take 1 Cap by mouth every day. 90 Cap 3   • Coenzyme Q10 (CO Q10) 100 MG Cap Take 300  "mg by mouth every day.     • Docusate Calcium (STOOL SOFTENER PO) Take 3 Tabs by mouth every day.     • Cholecalciferol (VITAMIN D) 2000 UNITS CAPS Take  by mouth.     • Calcium Carbonate-Vit D-Min (QC CALCIUM/MINERALS/VITAMIN D PO) Take  by mouth.     • MULTIPLE VITAMINS PO Take  by mouth.     • niacin 500 MG TABS Take 500 mg by mouth every day.     • Omega-3 Fatty Acids (FISH OIL) 1200 MG CAPS Take  by mouth.       No current facility-administered medications on file prior to visit.        No Known Allergies    Family History   Problem Relation Age of Onset   • Cancer Mother 57        breast cancer   • No Known Problems Father        Social History     Social History   • Marital status: Single     Spouse name: N/A   • Number of children: N/A   • Years of education: N/A     Occupational History   • Not on file.     Social History Main Topics   • Smoking status: Former Smoker     Packs/day: 1.00     Years: 10.00     Quit date: 1/1/1986   • Smokeless tobacco: Never Used      Comment: light smoker in past, quit about 43 yrs old.   • Alcohol use 0.5 oz/week     1 Glasses of wine per week      Comment: wine every dinner   • Drug use: No   • Sexual activity: Yes     Partners: Female     Other Topics Concern   • Not on file     Social History Narrative   • No narrative on file       Review of Systems   Constitutional: Negative for chills and fever.   Skin: Negative for itching and rash.   All other systems reviewed and are negative.       Objective:     A focused cutaneous exam was completed including: hair, ears, face, eyelids, conjunctiva, lips, neck/upper back with the following pertinent findings listed below. Remaining above-listed examined areas within normal limits / negative for rashes or lesions.    Temp 37.1 °C (98.8 °F)   Ht 1.778 m (5' 10\")   Wt 101.6 kg (224 lb)     Physical Exam   Constitutional: He is oriented to person, place, and time and well-developed, well-nourished, and in no distress.   HENT: "   Head: Normocephalic and atraumatic.       Right Ear: External ear normal.   Left Ear: External ear normal.   Nose: Nose normal.   Eyes: Conjunctivae are normal.   Neck: Normal range of motion.   Pulmonary/Chest: Effort normal.   Neurological: He is alert and oriented to person, place, and time.   Skin: Skin is warm and dry.   Psychiatric: Mood and affect normal.   Vitals reviewed.      DATA: none applicable to review    Assessment and Plan:     1. Neoplasm of uncertain behavior of skin  Procedure Note   Procedure: Biopsy by shave technique  Location: as noted above  Size: as noted in exam  Preoperative diagnosis: SK, r/o atypia  Risks, benefits and alternatives of procedure discussed and written informed consent obtained. Time out completed. Area of biopsy prepped with alcohol. Anesthesia with 1% lidocaine with epinephrine administered with 30 gauge needle. Shave biopsy of the site performed. Hemostasis achieved with pressure and aluminum chloride. Vaseline applied to wound with bandage. Patient tolerated procedure well and there were no complications. The specimen was sent to the pathology lab by the staff. Wound care was discussed.  - Pathology Specimen; Future    Followup: Return for further care pending results.    Patricia Berry M.D.

## 2019-05-24 LAB — PATHOLOGY CONSULT NOTE: NORMAL

## 2019-12-10 DIAGNOSIS — E78.5 DYSLIPIDEMIA: ICD-10-CM

## 2019-12-10 RX ORDER — ATORVASTATIN CALCIUM 20 MG/1
20 TABLET, FILM COATED ORAL DAILY
Qty: 100 TAB | Refills: 0 | Status: SHIPPED | OUTPATIENT
Start: 2019-12-10 | End: 2019-12-31 | Stop reason: SDUPTHER

## 2019-12-31 DIAGNOSIS — E78.5 DYSLIPIDEMIA: ICD-10-CM

## 2019-12-31 RX ORDER — ATORVASTATIN CALCIUM 20 MG/1
20 TABLET, FILM COATED ORAL DAILY
Qty: 100 TAB | Refills: 0 | Status: SHIPPED | OUTPATIENT
Start: 2019-12-31 | End: 2020-06-10 | Stop reason: SDUPTHER

## 2020-01-08 DIAGNOSIS — J30.89 CHRONIC NON-SEASONAL ALLERGIC RHINITIS: ICD-10-CM

## 2020-01-08 RX ORDER — FLUTICASONE PROPIONATE 50 MCG
SPRAY, SUSPENSION (ML) NASAL
Qty: 16 G | Refills: 5 | Status: SHIPPED | OUTPATIENT
Start: 2020-01-08 | End: 2020-06-10 | Stop reason: SDUPTHER

## 2020-01-08 NOTE — TELEPHONE ENCOUNTER
Was the patient seen in the last year in this department? Yes    Does patient have an active prescription for medications requested? No     Received Request Via: Pharmacy   n/a

## 2020-04-01 ENCOUNTER — PATIENT OUTREACH (OUTPATIENT)
Dept: HEALTH INFORMATION MANAGEMENT | Facility: OTHER | Age: 75
End: 2020-04-01

## 2020-04-01 NOTE — PROGRESS NOTES
Outcome: Left Message [facility] intro    Please transfer to Patient Outreach Team at [phone] when patient returns call.    [facility] Verified: yes    Attempt # 1

## 2020-05-11 DIAGNOSIS — I10 ESSENTIAL HYPERTENSION: ICD-10-CM

## 2020-05-11 DIAGNOSIS — K21.9 GASTROESOPHAGEAL REFLUX DISEASE WITHOUT ESOPHAGITIS: ICD-10-CM

## 2020-05-11 DIAGNOSIS — N40.0 BENIGN PROSTATIC HYPERPLASIA WITHOUT LOWER URINARY TRACT SYMPTOMS: ICD-10-CM

## 2020-05-11 RX ORDER — OMEPRAZOLE 40 MG/1
40 CAPSULE, DELAYED RELEASE ORAL DAILY
Qty: 30 CAP | Refills: 0 | Status: SHIPPED | OUTPATIENT
Start: 2020-05-11 | End: 2020-06-10 | Stop reason: SDUPTHER

## 2020-05-11 RX ORDER — TAMSULOSIN HYDROCHLORIDE 0.4 MG/1
0.4 CAPSULE ORAL DAILY
Qty: 30 CAP | Refills: 0 | Status: SHIPPED | OUTPATIENT
Start: 2020-05-11 | End: 2020-06-10 | Stop reason: SDUPTHER

## 2020-05-11 RX ORDER — HYDROCHLOROTHIAZIDE 25 MG/1
25 TABLET ORAL
Qty: 30 TAB | Refills: 0 | Status: SHIPPED | OUTPATIENT
Start: 2020-05-11 | End: 2020-06-10 | Stop reason: SDUPTHER

## 2020-05-14 NOTE — PROGRESS NOTES
Outcome: [facility]     Please transfer to Patient Outreach Team at [phone]  when patient returns call.    Attempt # 2

## 2020-06-08 DIAGNOSIS — N40.0 BENIGN PROSTATIC HYPERPLASIA WITHOUT LOWER URINARY TRACT SYMPTOMS: ICD-10-CM

## 2020-06-08 DIAGNOSIS — I10 ESSENTIAL HYPERTENSION: ICD-10-CM

## 2020-06-08 DIAGNOSIS — E87.6 HYPOKALEMIA: ICD-10-CM

## 2020-06-08 DIAGNOSIS — K21.9 GASTROESOPHAGEAL REFLUX DISEASE WITHOUT ESOPHAGITIS: ICD-10-CM

## 2020-06-08 RX ORDER — POTASSIUM CHLORIDE 20 MEQ/1
20 TABLET, EXTENDED RELEASE ORAL
Qty: 30 TAB | Refills: 0 | OUTPATIENT
Start: 2020-06-08

## 2020-06-08 RX ORDER — TAMSULOSIN HYDROCHLORIDE 0.4 MG/1
0.4 CAPSULE ORAL DAILY
Qty: 30 CAP | Refills: 0 | OUTPATIENT
Start: 2020-06-08

## 2020-06-08 RX ORDER — OMEPRAZOLE 40 MG/1
40 CAPSULE, DELAYED RELEASE ORAL DAILY
Qty: 30 CAP | Refills: 0 | OUTPATIENT
Start: 2020-06-08

## 2020-06-08 RX ORDER — METOPROLOL SUCCINATE 100 MG/1
200 TABLET, EXTENDED RELEASE ORAL
Qty: 60 TAB | Refills: 0 | OUTPATIENT
Start: 2020-06-08

## 2020-06-08 RX ORDER — HYDROCHLOROTHIAZIDE 25 MG/1
25 TABLET ORAL
Qty: 30 TAB | Refills: 0 | OUTPATIENT
Start: 2020-06-08

## 2020-06-08 NOTE — TELEPHONE ENCOUNTER
Patient not seen since March of last year and previous warnings have gone out about appointment.  Please advise him that I cannot keep refilling medicine without a yearly appointment and we can get him in tomorrow.  His medication refill today has been denied because of frequent refills without an appointment

## 2020-06-10 ENCOUNTER — OFFICE VISIT (OUTPATIENT)
Dept: MEDICAL GROUP | Facility: MEDICAL CENTER | Age: 75
End: 2020-06-10
Payer: MEDICARE

## 2020-06-10 VITALS
HEART RATE: 80 BPM | DIASTOLIC BLOOD PRESSURE: 78 MMHG | SYSTOLIC BLOOD PRESSURE: 138 MMHG | BODY MASS INDEX: 31.35 KG/M2 | OXYGEN SATURATION: 96 % | HEIGHT: 70 IN | WEIGHT: 219 LBS

## 2020-06-10 DIAGNOSIS — K21.9 GASTROESOPHAGEAL REFLUX DISEASE WITHOUT ESOPHAGITIS: ICD-10-CM

## 2020-06-10 DIAGNOSIS — I10 ESSENTIAL HYPERTENSION: ICD-10-CM

## 2020-06-10 DIAGNOSIS — D12.2 ADENOMATOUS POLYP OF ASCENDING COLON: ICD-10-CM

## 2020-06-10 DIAGNOSIS — E87.6 HYPOKALEMIA: ICD-10-CM

## 2020-06-10 DIAGNOSIS — E78.5 DYSLIPIDEMIA: ICD-10-CM

## 2020-06-10 DIAGNOSIS — E66.9 OBESITY (BMI 30-39.9): ICD-10-CM

## 2020-06-10 DIAGNOSIS — N40.0 BENIGN PROSTATIC HYPERPLASIA WITHOUT LOWER URINARY TRACT SYMPTOMS: ICD-10-CM

## 2020-06-10 DIAGNOSIS — D41.4 NEOPLASM OF UNCERTAIN BEHAVIOR OF BLADDER: ICD-10-CM

## 2020-06-10 DIAGNOSIS — J30.89 CHRONIC NON-SEASONAL ALLERGIC RHINITIS: ICD-10-CM

## 2020-06-10 DIAGNOSIS — Z00.00 MEDICARE ANNUAL WELLNESS VISIT, SUBSEQUENT: ICD-10-CM

## 2020-06-10 DIAGNOSIS — Z11.59 NEED FOR HEPATITIS C SCREENING TEST: ICD-10-CM

## 2020-06-10 PROCEDURE — G0439 PPPS, SUBSEQ VISIT: HCPCS | Performed by: NURSE PRACTITIONER

## 2020-06-10 RX ORDER — TAMSULOSIN HYDROCHLORIDE 0.4 MG/1
0.4 CAPSULE ORAL DAILY
Qty: 90 CAP | Refills: 3 | Status: SHIPPED | OUTPATIENT
Start: 2020-06-10 | End: 2021-06-06

## 2020-06-10 RX ORDER — HYDROCHLOROTHIAZIDE 25 MG/1
25 TABLET ORAL
Qty: 90 TAB | Refills: 3 | Status: SHIPPED | OUTPATIENT
Start: 2020-06-10 | End: 2021-06-06

## 2020-06-10 RX ORDER — METOPROLOL SUCCINATE 100 MG/1
TABLET, EXTENDED RELEASE ORAL
Qty: 180 TAB | Refills: 3 | Status: SHIPPED | OUTPATIENT
Start: 2020-06-10 | End: 2021-06-06

## 2020-06-10 RX ORDER — FLUTICASONE PROPIONATE 50 MCG
SPRAY, SUSPENSION (ML) NASAL
Qty: 16 G | Refills: 5 | Status: SHIPPED | OUTPATIENT
Start: 2020-06-10 | End: 2021-10-14

## 2020-06-10 RX ORDER — ATORVASTATIN CALCIUM 20 MG/1
20 TABLET, FILM COATED ORAL DAILY
Qty: 100 TAB | Refills: 3 | Status: SHIPPED | OUTPATIENT
Start: 2020-06-10 | End: 2021-08-01

## 2020-06-10 RX ORDER — POTASSIUM CHLORIDE 20 MEQ/1
20 TABLET, EXTENDED RELEASE ORAL
Qty: 90 TAB | Refills: 3 | Status: SHIPPED | OUTPATIENT
Start: 2020-06-10 | End: 2020-06-16 | Stop reason: SDUPTHER

## 2020-06-10 RX ORDER — OMEPRAZOLE 40 MG/1
40 CAPSULE, DELAYED RELEASE ORAL DAILY
Qty: 90 CAP | Refills: 3 | Status: SHIPPED | OUTPATIENT
Start: 2020-06-10 | End: 2021-06-06

## 2020-06-10 ASSESSMENT — PATIENT HEALTH QUESTIONNAIRE - PHQ9: CLINICAL INTERPRETATION OF PHQ2 SCORE: 0

## 2020-06-10 ASSESSMENT — FIBROSIS 4 INDEX: FIB4 SCORE: 1.13

## 2020-06-10 ASSESSMENT — ENCOUNTER SYMPTOMS: GENERAL WELL-BEING: GOOD

## 2020-06-10 ASSESSMENT — ACTIVITIES OF DAILY LIVING (ADL): BATHING_REQUIRES_ASSISTANCE: 0

## 2020-06-10 NOTE — PROGRESS NOTES
No chief complaint on file.        HPI:  Juma is a 74 y.o. here for Medicare Annual Wellness Visit    1. Medicare annual wellness visit, subsequent  Screening performed below.    2. Essential hypertension  Patient reports he continues to take his HCTZ and metoprolol but does not check his blood pressure outside the office and it is within acceptable range today in the office.  He has not done his lab work as of yet.    3. Gastroesophageal reflux disease without esophagitis  Patient wishes to continue with his omeprazole and was last seen by GI 2-1/2 years ago.    4. Benign prostatic hyperplasia without lower urinary tract symptoms  Patient continues with tamsulosin which he feels is helpful for his urination.    5. Bladder neoplasm of uncertain malignant potential  Patient states he has been to urology in the last year but his regular urologist left the area and he needs to make an appointment with a different urologist within the group.    6. Dyslipidemia  Patient did not do his lipid panel this year and it needs to be done to check on dosing needs for Lipitor.    7. Need for hepatitis C screening test  Patient due for screening    8. Hypokalemia  Patient on potassium once a day because of previously low potassium on his HCTZ.    9. Chronic non-seasonal allergic rhinitis  Patient states he did try Astelin and Flonase nasal spray but did not feel they were helpful.  His congestion is usually in the morning when he wakes up and he has postnasal drip and sinus congestion.    10. Obesity (BMI 30-39.9)  Patient continues to be overweight    11. Adenomatous polyp of ascending colon  This was a finding on a colonoscopy in November 2017 and he is on a 3-year recall.    Patient Active Problem List    Diagnosis Date Noted   • Dyslipidemia 02/07/2018   • Obesity (BMI 30-39.9) 02/09/2017   • Bladder neoplasm of uncertain malignant potential 07/15/2016   • Essential hypertension 01/15/2015   • Gastroesophageal reflux disease  without esophagitis 01/15/2015   • Benign prostatic hyperplasia without lower urinary tract symptoms 01/15/2015       Current Outpatient Medications   Medication Sig Dispense Refill   • omeprazole (PRILOSEC) 40 MG delayed-release capsule Take 1 Cap by mouth every day. 30 Cap 0   • tamsulosin (FLOMAX) 0.4 MG capsule Take 1 Cap by mouth every day. ONE-HALF  HOUR AFTER BREAKFAST. 30 Cap 0   • hydroCHLOROthiazide (HYDRODIURIL) 25 MG Tab Take 1 Tab by mouth every day. 30 Tab 0   • potassium chloride SA (KDUR) 20 MEQ Tab CR TAKE 1 TABLET BY MOUTH EVERY DAY 30 Tab 0   • metoprolol SR (TOPROL XL) 100 MG TABLET SR 24 HR TAKE 2 TABLETS BY MOUTH EVERY DAY 60 Tab 0   • fluticasone (FLONASE) 50 MCG/ACT nasal spray SHAKE LIQUID AND USE 2 SPRAYS IN EACH NOSTRIL EVERY DAY 16 g 5   • atorvastatin (LIPITOR) 20 MG Tab Take 1 Tab by mouth every day. 100 Tab 0   • azelastine (ASTELIN) 137 MCG/SPRAY nasal spray [place] 1 Spray in nose 2 times a day. 30 mL 11   • Coenzyme Q10 (CO Q10) 100 MG Cap Take 300 mg by mouth every day.     • Cholecalciferol (VITAMIN D) 2000 UNITS CAPS Take  by mouth.       No current facility-administered medications for this visit.         Patient is taking medications as noted in medication list.  Current supplements as per medication list.     Allergies: Patient has no known allergies.    Current social contact/activities:     Is patient current with immunizations? Yes.    He  reports that he quit smoking about 34 years ago. He has a 10.00 pack-year smoking history. He has never used smokeless tobacco. He reports current alcohol use of about 0.5 oz of alcohol per week. He reports that he does not use drugs.  Counseling given: Not Answered  Comment: light smoker in past, quit about 43 yrs old.        DPA/Advanced directive: Patient does not have an Advanced Directive.  A packet and workshop information was given on Advanced Directives.    ROS:    Gait: Uses no assistive device   Ostomy: No   Other tubes: No    Amputations: No   Chronic oxygen use No   Last eye exam 2019  Wears hearing aids: No   : Denies any urinary leakage during the last 6 months      Screening:        Depression Screening    Little interest or pleasure in doing things?  0 - not at all  Feeling down, depressed, or hopeless? 0 - not at all  Patient Health Questionnaire Score: 0    If depressive symptoms identified deferred to follow up visit unless specifically addressed in assessment and plan.    Interpretation of PHQ-9 Total Score   Score Severity   1-4 No Depression   5-9 Mild Depression   10-14 Moderate Depression   15-19 Moderately Severe Depression   20-27 Severe Depression    Screening for Cognitive Impairment    Three Minute Recall (village, kitchen, baby)  3/3    Raj clock face with all 12 numbers and set the hands to show 10 past 10.  Yes    If cognitive concerns identified, deferred for follow up unless specifically addressed in assessment and plan.    Fall Risk Assessment    Has the patient had two or more falls in the last year or any fall with injury in the last year?  No  If fall risk identified, deferred for follow up unless specifically addressed in assessment and plan.    Safety Assessment    Throw rugs on floor.  No  Handrails on all stairs.  Yes  Good lighting in all hallways.  Yes  Difficulty hearing.  No  Patient counseled about all safety risks that were identified.    Functional Assessment ADLs    Are there any barriers preventing you from cooking for yourself or meeting nutritional needs?  No.    Are there any barriers preventing you from driving safely or obtaining transportation?  No.    Are there any barriers preventing you from using a telephone or calling for help?  No.    Are there any barriers preventing you from shopping?  No.    Are there any barriers preventing you from taking care of your own finances?  No.    Are there any barriers preventing you from managing your medications?  No.    Are there any barriers  preventing you from showering, bathing or dressing yourself?  No.    Are you currently engaging in any exercise or physical activity?  Yes.     What is your perception of your health?  Good.    Health Maintenance Summary                HEPATITIS C SCREENING Overdue 1945     Annual Wellness Visit Overdue 3/21/2020      Done 3/21/2019 INITIAL ANNUAL WELLNESS VISIT-INCLUDES PPPS ()     Patient has more history with this topic...    IMM INFLUENZA Postponed 3/2/2022 Originally 2020. Patient Refused     Done 2016 Imm Admin: Influenza Vaccine Quad Inj (Preserved)    IMM ZOSTER VACCINES Postponed 3/9/2022 Originally 1996. System: vaccine not available, other system reasons    COLONOSCOPY Next Due 11/3/2020      Done 11/3/2017 REFERRAL TO GI FOR COLONOSCOPY     Patient has more history with this topic...    IMM DTaP/Tdap/Td Vaccine Next Due 2022      Done 2012 Imm Admin: Tdap Vaccine          Patient Care Team:  LADARIUS Sears as PCP - General (Family Medicine)  [facility]  [facility] (Inactive)  [facility] as      Social History     Tobacco Use   • Smoking status: Former Smoker     Packs/day: 1.00     Years: 10.00     Pack years: 10.00     Last attempt to quit: 1986     Years since quittin.4   • Smokeless tobacco: Never Used   • Tobacco comment: light smoker in past, quit about 43 yrs old.   Substance Use Topics   • Alcohol use: Yes     Alcohol/week: 0.5 oz     Types: 1 Glasses of wine per week     Comment: wine every dinner   • Drug use: No     Family History   Problem Relation Age of Onset   • Cancer Mother 57        breast cancer   • No Known Problems Father      He  has a past medical history of Arthritis, BPH (benign prostatic hyperplasia), GERD (gastroesophageal reflux disease), Glaucoma, Heart burn, Hyperlipidemia, Hypertension, and Indigestion.   Past Surgical History:   Procedure  "Laterality Date   • CYSTOSCOPY  7/15/2016    Procedure: CYSTOSCOPY;  Surgeon: Lew Moran M.D.;  Location: SURGERY [facility];  Service:    • TRANS URETHRAL RESECTION BLADDER  7/15/2016    Procedure: TRANS URETHRAL RESECTION BLADDER Tumor;  Surgeon: Lew Moran M.D.;  Location: SURGERY [facility];  Service:    • ELBA BY LAPAROSCOPY  8/27/2015    Procedure: ELBA BY LAPAROSCOPY;  Surgeon: Randall Ram M.D.;  Location: SURGERY [facility];  Service:    • UMBILICAL HERNIA REPAIR  8/27/2015    Procedure: UMBILICAL HERNIA REPAIR;  Surgeon: Randall Ram M.D.;  Location: SURGERY [facility];  Service:    • ELBA BY LAPAROSCOPY  2015   • APPENDECTOMY      at age 6   • OTHER ABDOMINAL SURGERY  1946    pyloric stenosis repair as an infant           Exam:     /78 (BP Location: Left arm, Patient Position: Sitting, BP Cuff Size: Adult)   Pulse 80   Ht 1.778 m (5' 10\")   Wt 99.3 kg (219 lb)   SpO2 96%  Body mass index is 31.42 kg/m².    Hearing good.    Dentition fair  Alert, oriented in no acute distress.  Eye contact is good, speech goal directed, affect calm      Assessment and Plan. The following treatment and monitoring plan is recommended:        1. Medicare annual wellness visit, subsequent  Annual wellness topics discussed, review of chronic medical problems completed    - Subsequent Annual Wellness Visit - Includes PPPS ()    2. Essential hypertension  Patient reminded that it is important he do his lab work to assess liver and kidney function on the medicines he is taking.  - Comp Metabolic Panel; Future  - hydroCHLOROthiazide (HYDRODIURIL) 25 MG Tab; Take 1 Tab by mouth every day.  Dispense: 90 Tab; Refill: 3  - metoprolol SR (TOPROL XL) 100 MG TABLET SR 24 HR; BID  Dispense: 180 Tab; Refill: 3    3. Gastroesophageal reflux disease without esophagitis  I will refill medicines and he will follow-up with gastroenterology within the year.  - omeprazole (PRILOSEC) 40 MG " delayed-release capsule; Take 1 Cap by mouth every day.  Dispense: 90 Cap; Refill: 3    4. Benign prostatic hyperplasia without lower urinary tract symptoms  Patient feels this is helpful so I will continue it.  - tamsulosin (FLOMAX) 0.4 MG capsule; Take 1 Cap by mouth every day. ONE-HALF  HOUR AFTER BREAKFAST.  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    6. Dyslipidemia  Patient will do his lipid panel this week.  - Lipid Profile; Future  - atorvastatin (LIPITOR) 20 MG Tab; Take 1 Tab by mouth every day.  Dispense: 100 Tab; Refill: 3    7. Need for hepatitis C screening test    - HEP C VIRUS ANTIBODY; Future    8. Hypokalemia  I advised patient I need to check potassium levels to assess his potassium needs.  This most likely occurred due to his HCTZ  - potassium chloride SA (KDUR) 20 MEQ Tab CR; Take 1 Tab by mouth every day.  Dispense: 90 Tab; Refill: 3    9. Chronic non-seasonal allergic rhinitis  I recommended going back on the Flonase nasal spray and I will switch him to an oral antihistamine.  If this does not work I recommended ENT or allergy referral  - fluticasone (FLONASE) 50 MCG/ACT nasal spray; SHAKE LIQUID AND USE 2 SPRAYS IN EACH NOSTRIL EVERY DAY  Dispense: 16 g; Refill: 5    10. Obesity (BMI 30-39.9)    - Patient identified as having weight management issue.  Appropriate orders and counseling given.    11. Adenomatous polyp of ascending colon  Patient reminded that he needs to contact [facility] consultants so they can have his 3-year follow-up colonoscopy in November.    Services suggested: No services needed at this time  Health Care Screening recommendations as per orders if indicated.  Referrals offered: PT/OT/Nutrition counseling/Behavioral Health/Smoking cessation as per orders if indicated.    Discussion today about general wellness and  lifestyle habits:    · Prevent falls and reduce trip hazards; Cautioned about securing or removing rugs.  · Have a working fire alarm and carbon monoxide detector;   · Engage in regular physical activity and social activities     Patient advised to follow-up in 6 months so we can do a physical exam as well.  Follow-up: No follow-ups on file.

## 2020-06-15 ENCOUNTER — HOSPITAL ENCOUNTER (OUTPATIENT)
Dept: LAB | Facility: MEDICAL CENTER | Age: 75
End: 2020-06-15
Attending: NURSE PRACTITIONER
Payer: MEDICARE

## 2020-06-15 DIAGNOSIS — I10 ESSENTIAL HYPERTENSION: ICD-10-CM

## 2020-06-15 DIAGNOSIS — Z11.59 NEED FOR HEPATITIS C SCREENING TEST: ICD-10-CM

## 2020-06-15 DIAGNOSIS — E78.5 DYSLIPIDEMIA: ICD-10-CM

## 2020-06-15 PROCEDURE — 36415 COLL VENOUS BLD VENIPUNCTURE: CPT

## 2020-06-15 PROCEDURE — 80061 LIPID PANEL: CPT

## 2020-06-15 PROCEDURE — 86803 HEPATITIS C AB TEST: CPT

## 2020-06-15 PROCEDURE — 80053 COMPREHEN METABOLIC PANEL: CPT

## 2020-06-16 DIAGNOSIS — E87.6 HYPOKALEMIA: ICD-10-CM

## 2020-06-16 LAB
ALBUMIN SERPL BCP-MCNC: 4.1 G/DL (ref 3.2–4.9)
ALBUMIN/GLOB SERPL: 1.6 G/DL
ALP SERPL-CCNC: 72 U/L (ref 30–99)
ALT SERPL-CCNC: 30 U/L (ref 2–50)
ANION GAP SERPL CALC-SCNC: 11 MMOL/L (ref 7–16)
AST SERPL-CCNC: 26 U/L (ref 12–45)
BILIRUB SERPL-MCNC: 1 MG/DL (ref 0.1–1.5)
BUN SERPL-MCNC: 16 MG/DL (ref 8–22)
CALCIUM SERPL-MCNC: 9.5 MG/DL (ref 8.5–10.5)
CHLORIDE SERPL-SCNC: 99 MMOL/L (ref 96–112)
CHOLEST SERPL-MCNC: 161 MG/DL (ref 100–199)
CO2 SERPL-SCNC: 28 MMOL/L (ref 20–33)
CREAT SERPL-MCNC: 0.56 MG/DL (ref 0.5–1.4)
FASTING STATUS PATIENT QL REPORTED: NORMAL
GLOBULIN SER CALC-MCNC: 2.6 G/DL (ref 1.9–3.5)
GLUCOSE SERPL-MCNC: 105 MG/DL (ref 65–99)
HCV AB SER QL: NORMAL
HDLC SERPL-MCNC: 48 MG/DL
LDLC SERPL CALC-MCNC: 96 MG/DL
POTASSIUM SERPL-SCNC: 3.2 MMOL/L (ref 3.6–5.5)
PROT SERPL-MCNC: 6.7 G/DL (ref 6–8.2)
SODIUM SERPL-SCNC: 138 MMOL/L (ref 135–145)
TRIGL SERPL-MCNC: 84 MG/DL (ref 0–149)

## 2020-06-16 RX ORDER — POTASSIUM CHLORIDE 20 MEQ/1
20 TABLET, EXTENDED RELEASE ORAL 2 TIMES DAILY
Qty: 180 TAB | Refills: 3 | Status: SHIPPED | OUTPATIENT
Start: 2020-06-16 | End: 2021-09-06

## 2020-06-29 RX ORDER — AZELASTINE 1 MG/ML
1 SPRAY, METERED NASAL 2 TIMES DAILY
Qty: 30 ML | Refills: 11 | Status: SHIPPED | OUTPATIENT
Start: 2020-06-29 | End: 2021-10-14

## 2021-01-15 DIAGNOSIS — Z23 NEED FOR VACCINATION: ICD-10-CM

## 2021-03-23 ENCOUNTER — IMMUNIZATION (OUTPATIENT)
Dept: FAMILY PLANNING/WOMEN'S HEALTH CLINIC | Facility: IMMUNIZATION CENTER | Age: 76
End: 2021-03-23
Attending: INTERNAL MEDICINE
Payer: MEDICARE

## 2021-03-23 DIAGNOSIS — Z23 ENCOUNTER FOR VACCINATION: Primary | ICD-10-CM

## 2021-03-23 DIAGNOSIS — Z23 NEED FOR VACCINATION: ICD-10-CM

## 2021-03-23 PROCEDURE — 91300 PFIZER SARS-COV-2 VACCINE: CPT

## 2021-03-23 PROCEDURE — 0001A PFIZER SARS-COV-2 VACCINE: CPT

## 2021-04-15 ENCOUNTER — IMMUNIZATION (OUTPATIENT)
Dept: FAMILY PLANNING/WOMEN'S HEALTH CLINIC | Facility: IMMUNIZATION CENTER | Age: 76
End: 2021-04-15
Payer: MEDICARE

## 2021-04-15 DIAGNOSIS — Z23 ENCOUNTER FOR VACCINATION: Primary | ICD-10-CM

## 2021-04-15 PROCEDURE — 0002A PFIZER SARS-COV-2 VACCINE: CPT | Performed by: INTERNAL MEDICINE

## 2021-04-15 PROCEDURE — 91300 PFIZER SARS-COV-2 VACCINE: CPT | Performed by: INTERNAL MEDICINE

## 2021-06-04 ENCOUNTER — OFFICE VISIT (OUTPATIENT)
Dept: MEDICAL GROUP | Facility: MEDICAL CENTER | Age: 76
End: 2021-06-04
Payer: MEDICARE

## 2021-06-04 VITALS
OXYGEN SATURATION: 94 % | TEMPERATURE: 98.4 F | RESPIRATION RATE: 18 BRPM | DIASTOLIC BLOOD PRESSURE: 84 MMHG | SYSTOLIC BLOOD PRESSURE: 126 MMHG | WEIGHT: 232 LBS | BODY MASS INDEX: 33.21 KG/M2 | HEART RATE: 89 BPM | HEIGHT: 70 IN

## 2021-06-04 DIAGNOSIS — E66.9 OBESITY (BMI 30-39.9): ICD-10-CM

## 2021-06-04 DIAGNOSIS — E78.5 DYSLIPIDEMIA: ICD-10-CM

## 2021-06-04 DIAGNOSIS — K21.9 GASTROESOPHAGEAL REFLUX DISEASE WITHOUT ESOPHAGITIS: ICD-10-CM

## 2021-06-04 DIAGNOSIS — D12.2 ADENOMATOUS POLYP OF ASCENDING COLON: ICD-10-CM

## 2021-06-04 DIAGNOSIS — J30.1 HAY FEVER: ICD-10-CM

## 2021-06-04 DIAGNOSIS — N13.9 OBSTRUCTIVE UROPATHY: ICD-10-CM

## 2021-06-04 DIAGNOSIS — D41.4 NEOPLASM OF UNCERTAIN BEHAVIOR OF BLADDER: ICD-10-CM

## 2021-06-04 DIAGNOSIS — I10 ESSENTIAL HYPERTENSION: ICD-10-CM

## 2021-06-04 PROCEDURE — G0439 PPPS, SUBSEQ VISIT: HCPCS | Performed by: INTERNAL MEDICINE

## 2021-06-04 PROCEDURE — 8041 PR SCP AHA: Performed by: INTERNAL MEDICINE

## 2021-06-04 ASSESSMENT — PATIENT HEALTH QUESTIONNAIRE - PHQ9: CLINICAL INTERPRETATION OF PHQ2 SCORE: 0

## 2021-06-04 ASSESSMENT — PAIN SCALES - GENERAL: PAINLEVEL: NO PAIN

## 2021-06-04 ASSESSMENT — VISUAL ACUITY
OD_CC: 20/40
OS_CC: 20/40

## 2021-06-04 ASSESSMENT — ENCOUNTER SYMPTOMS: GENERAL WELL-BEING: GOOD

## 2021-06-04 ASSESSMENT — ACTIVITIES OF DAILY LIVING (ADL): BATHING_REQUIRES_ASSISTANCE: 0

## 2021-06-04 NOTE — ASSESSMENT & PLAN NOTE
He has had problems with colon polyps and had 3 polyps removed with his last colonoscopy in March.

## 2021-06-04 NOTE — PROGRESS NOTES
Chief Complaint   Patient presents with   • Annual Wellness Visit         HPI:  Juma is a 75 y.o. here for Medicare Annual Wellness Visit    ***    Patient Active Problem List    Diagnosis Date Noted   • Adenomatous polyp of ascending colon 06/10/2020   • Dyslipidemia 02/07/2018   • Obesity (BMI 30-39.9) 02/09/2017   • Bladder neoplasm of uncertain malignant potential 07/15/2016   • Essential hypertension 01/15/2015   • Gastroesophageal reflux disease without esophagitis 01/15/2015   • Benign prostatic hyperplasia without lower urinary tract symptoms 01/15/2015       Current Outpatient Medications   Medication Sig Dispense Refill   • potassium chloride SA (KDUR) 20 MEQ Tab CR Take 1 Tab by mouth 2 times a day. 180 Tab 3   • omeprazole (PRILOSEC) 40 MG delayed-release capsule Take 1 Cap by mouth every day. 90 Cap 3   • tamsulosin (FLOMAX) 0.4 MG capsule Take 1 Cap by mouth every day. ONE-HALF  HOUR AFTER BREAKFAST. 90 Cap 3   • hydroCHLOROthiazide (HYDRODIURIL) 25 MG Tab Take 1 Tab by mouth every day. 90 Tab 3   • metoprolol SR (TOPROL XL) 100 MG TABLET SR 24 HR  Tab 3   • atorvastatin (LIPITOR) 20 MG Tab Take 1 Tab by mouth every day. 100 Tab 3   • Coenzyme Q10 (CO Q10) 100 MG Cap Take 300 mg by mouth every day.     • Cholecalciferol (VITAMIN D) 2000 UNITS CAPS Take  by mouth.     • azelastine (ASTELIN) 137 MCG/SPRAY nasal spray [place] 1 Spray in nose 2 times a day. (Patient not taking: Reported on 6/4/2021) 30 mL 11   • fluticasone (FLONASE) 50 MCG/ACT nasal spray SHAKE LIQUID AND USE 2 SPRAYS IN EACH NOSTRIL EVERY DAY (Patient not taking: Reported on 6/4/2021) 16 g 5     No current facility-administered medications for this visit.        {MEDICATION ADHERENCE:20401}  Current supplements as per medication list.     Allergies: Patient has no known allergies.    Current social contact/activities: *** ***    Is patient current with immunizations? {YES/NO DUE FOR IZ:20402}    He  reports that he quit smoking  about 35 years ago. He has a 10.00 pack-year smoking history. He has never used smokeless tobacco. He reports current alcohol use of about 0.5 oz of alcohol per week. He reports that he does not use drugs.  Counseling given: Not Answered  Comment: light smoker in past, quit about 43 yrs old.        DPA/Advanced directive: Patient does not have an Advanced Directive.  A packet and workshop information was given on Advanced Directives.    ROS:    Gait: Uses no assistive device ***  Ostomy: No ***  Other tubes: No ***  Amputations: No ***  Chronic oxygen use No ***  Last eye exam 2021  Wears hearing aids: No ***  : Denies any urinary leakage during the last 6 months  ***    Screening:    ***    Depression Screening    Little interest or pleasure in doing things?  0 - not at all  Feeling down, depressed, or hopeless? 0 - not at all  Patient Health Questionnaire Score: 0    If depressive symptoms identified deferred to follow up visit unless specifically addressed in assessment and plan.    Interpretation of PHQ-9 Total Score   Score Severity   1-4 No Depression   5-9 Mild Depression   10-14 Moderate Depression   15-19 Moderately Severe Depression   20-27 Severe Depression    Screening for Cognitive Impairment    Three Minute Recall (captain, garden, picture)  3/3    Raj clock face with all 12 numbers and set the hands to show 5 past 8.  Yes    If cognitive concerns identified, deferred for follow up unless specifically addressed in assessment and plan.    Fall Risk Assessment    Has the patient had two or more falls in the last year or any fall with injury in the last year?  No  If fall risk identified, deferred for follow up unless specifically addressed in assessment and plan.    Safety Assessment    Throw rugs on floor.  No  Handrails on all stairs.  Yes  Good lighting in all hallways.  Yes  Difficulty hearing.  No  Patient counseled about all safety risks that were identified.    Functional Assessment ADLs    Are  there any barriers preventing you from cooking for yourself or meeting nutritional needs?  No.    Are there any barriers preventing you from driving safely or obtaining transportation?  No.    Are there any barriers preventing you from using a telephone or calling for help?  No.    Are there any barriers preventing you from shopping?  No.    Are there any barriers preventing you from taking care of your own finances?  No.    Are there any barriers preventing you from managing your medications?  No.    Are there any barriers preventing you from showering, bathing or dressing yourself?  No.    Are you currently engaging in any exercise or physical activity?  Yes.     What is your perception of your health?  Good.    Health Maintenance Summary                IMM INFLUENZA Postponed 3/2/2022 Originally 2021. Patient Refused     Done 2016 Imm Admin: Influenza Vaccine Quad Inj (Preserved)    IMM ZOSTER VACCINES Postponed 3/9/2022 Originally 1996. System: vaccine not available, other system reasons    Annual Wellness Visit Next Due 2021      Done 6/10/2020 SUBSEQUENT ANNUAL WELLNESS VISIT-INCLUDES PPPS ()     Patient has more history with this topic...    IMM DTaP/Tdap/Td Vaccine Next Due 2022      Done 2012 Imm Admin: Tdap Vaccine    COLONOSCOPY Next Due 3/19/2026      Done 3/19/2021 REFERRAL TO GI FOR COLONOSCOPY     Patient has more history with this topic...          Patient Care Team:  LADARIUS Sears as PCP - General (Family Medicine)  [facility]  [facility] (Inactive)  [facility] as      Social History     Tobacco Use   • Smoking status: Former Smoker     Packs/day: 1.00     Years: 10.00     Pack years: 10.00     Quit date: 1986     Years since quittin.4   • Smokeless tobacco: Never Used   • Tobacco comment: light smoker in past, quit about 43 yrs old.   Substance Use Topics   •  Alcohol use: Yes     Alcohol/week: 0.5 oz     Types: 1 Glasses of wine per week     Comment: wine every dinner   • Drug use: No     Family History   Problem Relation Age of Onset   • Cancer Mother 57        breast cancer   • No Known Problems Father      He  has a past medical history of Arthritis, BPH (benign prostatic hyperplasia), GERD (gastroesophageal reflux disease), Glaucoma, Heart burn, Hyperlipidemia, Hypertension, and Indigestion.   Past Surgical History:   Procedure Laterality Date   • CYSTOSCOPY  7/15/2016    Procedure: CYSTOSCOPY;  Surgeon: Lew Moran M.D.;  Location: SURGERY [facility];  Service:    • TRANS URETHRAL RESECTION BLADDER  7/15/2016    Procedure: TRANS URETHRAL RESECTION BLADDER Tumor;  Surgeon: Lew Moran M.D.;  Location: SURGERY [facility];  Service:    • ELBA BY LAPAROSCOPY  8/27/2015    Procedure: ELBA BY LAPAROSCOPY;  Surgeon: Randall Ram M.D.;  Location: [facility];  Service:    • UMBILICAL HERNIA REPAIR  8/27/2015    Procedure: UMBILICAL HERNIA REPAIR;  Surgeon: Randall Ram M.D.;  Location: SURGERY [facility];  Service:    • ELBA BY LAPAROSCOPY  2015   • APPENDECTOMY      at age 6   • OTHER ABDOMINAL SURGERY  1945    pyloric stenosis repair as an infant           Exam:     There were no vitals taken for this visit. There is no height or weight on file to calculate BMI.    Hearing {GOOD/FAIR/POOR/EXCELLENT:60530}.    Dentition {DENTITION:04836}  Alert, oriented in no acute distress.  Eye contact is good, speech goal directed, affect calm  ***    Assessment and Plan. The following treatment and monitoring plan is recommended:  ***  No diagnosis found.      Services suggested: { AWV COORDINATION OF SERVICES:99816}  Health Care Screening recommendations as per orders if indicated.  Referrals offered: PT/OT/Nutrition counseling/Behavioral Health/Smoking cessation as per orders if indicated.    Discussion today about general wellness and  lifestyle habits:    · Prevent falls and reduce trip hazards; Cautioned about securing or removing rugs.  · Have a working fire alarm and carbon monoxide detector;   · Engage in regular physical activity and social activities       Follow-up: No follow-ups on file. Annual Health Assessment Questions:    1.  Are you currently engaging in any exercise or physical activity? Yes    2.  How would you describe your mood or emotional well-being today? good    3.  Have you had any falls in the last year? No    4.  Have you noticed any problems with your balance or had difficulty walking? No    5.  In the last six months have you experienced any leakage of urine? No    6. DPA/Advanced Directive: Patient does not have an Advanced Directive.  A packet and workshop information was given on Advanced Directives.

## 2021-06-04 NOTE — ASSESSMENT & PLAN NOTE
He is significantly overweight with BMI 33.29.  He has gained about 13 pounds in the last year.  He was encouraged in a weight loss program.

## 2021-06-04 NOTE — ASSESSMENT & PLAN NOTE
He has hypertension for which he is on hydrochlorothiazide.  Blood pressure today is satisfactory.  He denies lightheadedness.  He tries to follow a low-salt diet with some success.

## 2021-06-04 NOTE — ASSESSMENT & PLAN NOTE
He has a history of a bladder neoplasm that was resected about a year ago without evidence of recurrence.

## 2021-06-04 NOTE — PROGRESS NOTES
Chief Complaint   Patient presents with   • Annual Wellness Visit         HPI:  Juma is a 75 y.o. 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 180 Tab 3   • omeprazole (PRILOSEC) 40 MG delayed-release capsule Take 1 Cap by mouth every day. 90 Cap 3   • tamsulosin (FLOMAX) 0.4 MG capsule Take 1 Cap by mouth every day. ONE-HALF  HOUR AFTER BREAKFAST. 90 Cap 3   • hydroCHLOROthiazide (HYDRODIURIL) 25 MG Tab Take 1 Tab by mouth every day. 90 Tab 3   • metoprolol SR (TOPROL XL) 100 MG TABLET SR 24 HR  Tab 3   • atorvastatin (LIPITOR) 20 MG Tab Take 1 Tab by mouth every day. 100 Tab 3   • Coenzyme Q10 (CO Q10) 100 MG Cap Take 300 mg by mouth every day.     • Cholecalciferol (VITAMIN D) 2000 UNITS CAPS Take  by mouth.     • azelastine (ASTELIN) 137 MCG/SPRAY nasal spray [place] 1 Spray in nose 2 times a day. (Patient not taking: Reported on 6/4/2021) 30 mL 11   • fluticasone (FLONASE) 50 MCG/ACT nasal spray SHAKE LIQUID AND USE 2 SPRAYS IN EACH NOSTRIL EVERY DAY (Patient not taking: Reported on 6/4/2021) 16 g 5     No current facility-administered medications for this visit.        Patient is taking medications as noted in medication list.  Current supplements as per medication list.     Allergies: Patient has no known allergies.    Current social contact/activities: friends    Is patient  current with immunizations? Yes.    He  reports that he quit smoking about 35 years ago. He has a 10.00 pack-year smoking history. He has never used smokeless tobacco. He reports current alcohol use of about 0.5 oz of alcohol per week. He reports that he does not use drugs.  Counseling given: Not Answered  Comment: light smoker in past, quit about 43 yrs old.        DPA/Advanced directive: Patient does not have an Advanced Directive.  A packet and workshop information was given on Advanced Directives.    ROS:    Gait: Uses no assistive device   Ostomy: No   Other tubes: No   Amputations: No   Chronic oxygen use No   Last eye exam 2021  Wears hearing aids: No   : Denies any urinary leakage during the last 6 months      Screening:        Depression Screening    Little interest or pleasure in doing things?  0 - not at all  Feeling down, depressed, or hopeless? 0 - not at all  Patient Health Questionnaire Score: 0    If depressive symptoms identified deferred to follow up visit unless specifically addressed in assessment and plan.    Interpretation of PHQ-9 Total Score   Score Severity   1-4 No Depression   5-9 Mild Depression   10-14 Moderate Depression   15-19 Moderately Severe Depression   20-27 Severe Depression    Screening for Cognitive Impairment    Three Minute Recall (captain, garden, picture)  3/3    Raj clock face with all 12 numbers and set the hands to show 5 past 8.  Yes    If cognitive concerns identified, deferred for follow up unless specifically addressed in assessment and plan.    Fall Risk Assessment    Has the patient had two or more falls in the last year or any fall with injury in the last year?  No  If fall risk identified, deferred for follow up unless specifically addressed in assessment and plan.    Safety Assessment    Throw rugs on floor.  No  Handrails on all stairs.  Yes  Good lighting in all hallways.  Yes  Difficulty hearing.  No  Patient counseled about all safety risks that were  identified.    Functional Assessment ADLs    Are there any barriers preventing you from cooking for yourself or meeting nutritional needs?  No.    Are there any barriers preventing you from driving safely or obtaining transportation?  No.    Are there any barriers preventing you from using a telephone or calling for help?  No.    Are there any barriers preventing you from shopping?  No.    Are there any barriers preventing you from taking care of your own finances?  No.    Are there any barriers preventing you from managing your medications?  No.    Are there any barriers preventing you from showering, bathing or dressing yourself?  No.    Are you currently engaging in any exercise or physical activity?  Yes.     What is your perception of your health?  Good.    Health Maintenance Summary                IMM INFLUENZA Postponed 3/2/2022 Originally 2021. Patient Refused     Done 2016 Imm Admin: Influenza Vaccine Quad Inj (Preserved)    IMM ZOSTER VACCINES Postponed 3/9/2022 Originally 1996. System: vaccine not available, other system reasons    IMM DTaP/Tdap/Td Vaccine Next Due 2022      Done 2012 Imm Admin: Tdap Vaccine    Annual Wellness Visit Next Due 2022      Done 2021      Patient has more history with this topic...    COLONOSCOPY Next Due 3/19/2026      Done 3/19/2021 REFERRAL TO GI FOR COLONOSCOPY     Patient has more history with this topic...          Patient Care Team:  LADARIUS Sears as PCP - General (Family Medicine)  [facility]  [facility] (Inactive)  [facility] as      Social History     Tobacco Use   • Smoking status: Former Smoker     Packs/day: 1.00     Years: 10.00     Pack years: 10.00     Quit date: 1986     Years since quittin.4   • Smokeless tobacco: Never Used   • Tobacco comment: light smoker in past, quit about 43 yrs old.   Substance Use Topics   • Alcohol  "use: Yes     Alcohol/week: 0.5 oz     Types: 1 Glasses of wine per week     Comment: wine every dinner   • Drug use: No     Family History   Problem Relation Age of Onset   • Cancer Mother 57        breast cancer   • No Known Problems Father      He  has a past medical history of Arthritis, BPH (benign prostatic hyperplasia), GERD (gastroesophageal reflux disease), Glaucoma, Hay fever (6/4/2021), Heart burn, Hyperlipidemia, Hypertension, Indigestion, and Obstructive uropathy (6/4/2021).   Past Surgical History:   Procedure Laterality Date   • CYSTOSCOPY  7/15/2016    Procedure: CYSTOSCOPY;  Surgeon: Lew Moran M.D.;  Location: [facility] [place] [facility];  Service:    • TRANS URETHRAL RESECTION BLADDER  7/15/2016    Procedure: TRANS URETHRAL RESECTION BLADDER Tumor;  Surgeon: Lew Moran M.D.;  Location: [facility] [place] [facility];  Service:    • ELBA BY LAPAROSCOPY  8/27/2015    Procedure: ELBA BY LAPAROSCOPY;  Surgeon: Randall Ram M.D.;  Location: [facility] [place] [facility];  Service:    • UMBILICAL HERNIA REPAIR  8/27/2015    Procedure: UMBILICAL HERNIA REPAIR;  Surgeon: Randall Ram M.D.;  Location: [facility] [place] [facility];  Service:    • ELBA BY LAPAROSCOPY  2015   • APPENDECTOMY      at age 6   • OTHER ABDOMINAL SURGERY  1946    pyloric stenosis repair as an infant           Exam:     /84 (BP Location: Left arm, Patient Position: Sitting, BP Cuff Size: Adult)   Pulse 89   Temp 36.9 °C (98.4 °F) (Temporal)   Resp 18   Ht 1.778 m (5' 10\")   Wt 105 kg (232 lb)   SpO2 94%  Body mass index is 33.29 kg/m².    Hearing excellent.    Dentition good  Alert, oriented in no acute distress.  Eye contact is good, speech goal directed, affect calm.  Neck is supple without significant lymphadenopathy.  Lungs are clear without rales or wheeze.  Cardiovascular peripheral circulation is satisfactory although pedal pulses are trace to 1+ at best.  Heart sounds are unremarkable.  Rhythm is regular.  Abdomen " is moderately obese without obvious masses or tenderness.  There are normal bowel sounds.  Rectal and genital examinations were not performed at this time.  Extremities had trace ankle edema bilaterally.  Neurologic exam was unremarkable.      Assessment and Plan. The following treatment and monitoring plan is recommended:    Essential hypertension  He has hypertension for which he is on hydrochlorothiazide.  Blood pressure today is satisfactory.  He denies lightheadedness.  He tries to follow a low-salt diet with some success.    Gastroesophageal reflux disease without esophagitis  He has gastroesophageal reflux with symptoms fairly well controlled with Prilosec which he takes regularly.    Bladder neoplasm of uncertain malignant potential  He has a history of a bladder neoplasm that was resected about a year ago without evidence of recurrence.    Obesity (BMI 30-39.9)  He is significantly overweight with BMI 33.29.  He has gained about 13 pounds in the last year.  He was encouraged in a weight loss program.    Dyslipidemia  He has hyperlipidemia for which he is on atorvastatin.  He did not yet get lab results done yet. He is overweight.    Adenomatous polyp of ascending colon  He has had problems with colon polyps and had 3 polyps removed with his last colonoscopy in March.    Hay fever  He has a history of hayfever for which she has been using Astelin as well as Flonase with limited success.  He was encouraged to also take an antihistamine.  We briefly reviewed potential side effects.    Obstructive uropathy  He has obstructive uropathy for which he is on Flomax with fairly good relief of symptoms.        Services suggested: No services needed at this time  Health Care Screening recommendations as per orders if indicated.  Referrals offered: PT/OT/Nutrition counseling/Behavioral Health/Smoking cessation as per orders if indicated.    Discussion today about general wellness and lifestyle habits:    · Prevent falls  and reduce trip hazards; Cautioned about securing or removing rugs.  · Have a working fire alarm and carbon monoxide detector;   · Engage in regular physical activity and social activities       Follow-up: If not sooner he will be seen again in 6 months with repeat labs.     Annual Health Assessment Questions:    1.  Are you currently engaging in any exercise or physical activity? Yes    2.  How would you describe your mood or emotional well-being today? good    3.  Have you had any falls in the last year? Yes    4.  Have you noticed any problems with your balance or had difficulty walking? No    5.  In the last six months have you experienced any leakage of urine? No    6. DPA/Advanced Directive: Patient does not have an Advanced Directive.  A packet and workshop information was given on Advanced Directives.

## 2021-06-04 NOTE — ASSESSMENT & PLAN NOTE
He has hyperlipidemia for which he is on atorvastatin.  He did not yet get lab results done yet. He is overweight.

## 2021-06-04 NOTE — ASSESSMENT & PLAN NOTE
He has a history of hayfever for which she has been using Astelin as well as Flonase with limited success.  He was encouraged to also take an antihistamine.  We briefly reviewed potential side effects.

## 2021-06-04 NOTE — ASSESSMENT & PLAN NOTE
He has gastroesophageal reflux with symptoms fairly well controlled with Prilosec which he takes regularly.

## 2021-06-05 DIAGNOSIS — K21.9 GASTROESOPHAGEAL REFLUX DISEASE WITHOUT ESOPHAGITIS: ICD-10-CM

## 2021-06-05 DIAGNOSIS — N40.0 BENIGN PROSTATIC HYPERPLASIA WITHOUT LOWER URINARY TRACT SYMPTOMS: ICD-10-CM

## 2021-06-05 DIAGNOSIS — I10 ESSENTIAL HYPERTENSION: ICD-10-CM

## 2021-06-06 RX ORDER — METOPROLOL SUCCINATE 100 MG/1
TABLET, EXTENDED RELEASE ORAL
Qty: 180 TABLET | Refills: 3 | Status: SHIPPED | OUTPATIENT
Start: 2021-06-06 | End: 2022-05-31

## 2021-06-06 RX ORDER — TAMSULOSIN HYDROCHLORIDE 0.4 MG/1
CAPSULE ORAL
Qty: 90 CAPSULE | Refills: 3 | Status: SHIPPED | OUTPATIENT
Start: 2021-06-06 | End: 2022-05-31

## 2021-06-06 RX ORDER — HYDROCHLOROTHIAZIDE 25 MG/1
TABLET ORAL
Qty: 90 TABLET | Refills: 3 | Status: SHIPPED | OUTPATIENT
Start: 2021-06-06 | End: 2022-05-31

## 2021-06-06 RX ORDER — OMEPRAZOLE 40 MG/1
CAPSULE, DELAYED RELEASE ORAL
Qty: 90 CAPSULE | Refills: 3 | Status: SHIPPED | OUTPATIENT
Start: 2021-06-06 | End: 2022-05-31

## 2021-06-17 ENCOUNTER — HOSPITAL ENCOUNTER (OUTPATIENT)
Dept: LAB | Facility: MEDICAL CENTER | Age: 76
End: 2021-06-17
Attending: INTERNAL MEDICINE
Payer: MEDICARE

## 2021-06-17 DIAGNOSIS — E78.5 DYSLIPIDEMIA: ICD-10-CM

## 2021-06-17 DIAGNOSIS — I10 ESSENTIAL HYPERTENSION: ICD-10-CM

## 2021-06-17 PROCEDURE — 80048 BASIC METABOLIC PNL TOTAL CA: CPT

## 2021-06-17 PROCEDURE — 80061 LIPID PANEL: CPT

## 2021-06-17 PROCEDURE — 36415 COLL VENOUS BLD VENIPUNCTURE: CPT

## 2021-06-18 LAB
ANION GAP SERPL CALC-SCNC: 10 MMOL/L (ref 7–16)
BUN SERPL-MCNC: 14 MG/DL (ref 8–22)
CALCIUM SERPL-MCNC: 9.5 MG/DL (ref 8.5–10.5)
CHLORIDE SERPL-SCNC: 100 MMOL/L (ref 96–112)
CHOLEST SERPL-MCNC: 148 MG/DL (ref 100–199)
CO2 SERPL-SCNC: 26 MMOL/L (ref 20–33)
CREAT SERPL-MCNC: 0.63 MG/DL (ref 0.5–1.4)
FASTING STATUS PATIENT QL REPORTED: NORMAL
GLUCOSE SERPL-MCNC: 90 MG/DL (ref 65–99)
HDLC SERPL-MCNC: 40 MG/DL
LDLC SERPL CALC-MCNC: 87 MG/DL
POTASSIUM SERPL-SCNC: 3.3 MMOL/L (ref 3.6–5.5)
SODIUM SERPL-SCNC: 136 MMOL/L (ref 135–145)
TRIGL SERPL-MCNC: 103 MG/DL (ref 0–149)

## 2021-07-30 ENCOUNTER — PATIENT OUTREACH (OUTPATIENT)
Dept: HEALTH INFORMATION MANAGEMENT | Facility: OTHER | Age: 76
End: 2021-07-30

## 2021-07-30 NOTE — NON-PROVIDER
Pt called stating that he saw Dr. Lauren couple days ago and he prescribed a medication that was sent to [facility] wrongly, and that his prefer pharmacy is [facility], now he cannot get his med./  I called Dr. Lauren's office and  LVM, with pt's info. / Pt was informed and he has no more request or questions at this time.

## 2021-07-31 DIAGNOSIS — E78.5 DYSLIPIDEMIA: ICD-10-CM

## 2021-08-01 RX ORDER — ATORVASTATIN CALCIUM 20 MG/1
TABLET, FILM COATED ORAL
Qty: 100 TABLET | Refills: 0 | Status: SHIPPED | OUTPATIENT
Start: 2021-08-01 | End: 2021-11-08

## 2021-08-24 ENCOUNTER — PATIENT MESSAGE (OUTPATIENT)
Dept: HEALTH INFORMATION MANAGEMENT | Facility: OTHER | Age: 76
End: 2021-08-24

## 2021-09-27 NOTE — NON-PROVIDER
Inbound call from mbr stating he would like to schedule to establish with new provider. Educated mbr on [facility] website directories tool and [email or web address] find a doctor tab. Informed mbr of [facility] providers in [facility] office and [place] office. Mbr will call back when ready to schedule. No further needs at this time.

## 2021-10-14 ENCOUNTER — OFFICE VISIT (OUTPATIENT)
Dept: MEDICAL GROUP | Facility: PHYSICIAN GROUP | Age: 76
End: 2021-10-14
Payer: MEDICARE

## 2021-10-14 VITALS
RESPIRATION RATE: 14 BRPM | HEIGHT: 70 IN | BODY MASS INDEX: 32.93 KG/M2 | TEMPERATURE: 98.8 F | DIASTOLIC BLOOD PRESSURE: 70 MMHG | SYSTOLIC BLOOD PRESSURE: 136 MMHG | WEIGHT: 230 LBS | OXYGEN SATURATION: 96 % | HEART RATE: 84 BPM

## 2021-10-14 DIAGNOSIS — N40.1 BENIGN PROSTATIC HYPERPLASIA WITH NOCTURIA: ICD-10-CM

## 2021-10-14 DIAGNOSIS — E87.6 HYPOKALEMIA: ICD-10-CM

## 2021-10-14 DIAGNOSIS — I10 ESSENTIAL HYPERTENSION: ICD-10-CM

## 2021-10-14 DIAGNOSIS — Z76.89 ENCOUNTER TO ESTABLISH CARE: ICD-10-CM

## 2021-10-14 DIAGNOSIS — Z85.51 HISTORY OF BLADDER CANCER: ICD-10-CM

## 2021-10-14 DIAGNOSIS — R35.1 BENIGN PROSTATIC HYPERPLASIA WITH NOCTURIA: ICD-10-CM

## 2021-10-14 PROCEDURE — 99213 OFFICE O/P EST LOW 20 MIN: CPT | Performed by: FAMILY MEDICINE

## 2021-10-14 RX ORDER — OFLOXACIN 3 MG/ML
SOLUTION/ DROPS OPHTHALMIC
COMMUNITY
Start: 2021-10-08 | End: 2022-11-04

## 2021-10-14 RX ORDER — BROMFENAC 0.76 MG/ML
SOLUTION/ DROPS OPHTHALMIC
COMMUNITY
Start: 2021-10-11 | End: 2022-11-04

## 2021-10-14 RX ORDER — LOTEPREDNOL ETABONATE 10 MG/ML
SUSPENSION TOPICAL
COMMUNITY
Start: 2021-10-11 | End: 2022-11-04

## 2021-10-14 NOTE — ASSESSMENT & PLAN NOTE
This is a chronic problem.  Patient is on Flomax to help with BPH symptoms.  He states he gets up about 2 times at night and he is comfortable with that.  His last PSA was done 5 years ago and was slightly elevated at 4.2.

## 2021-10-14 NOTE — PROGRESS NOTES
Subjective:     CC: Here to establish care.    HPI:   Juma presents today with the following medical concerns:    Encounter to establish care  Patient is here today to establish care.  He is having no problems today's visit.  His wellness and lab work are current.  His last lab did show some mild hypokalemia but he is on a diuretic.  He does not need any medication refills today.    History of bladder cancer  This is a chronic issue.  Patient states he had a previous bladder cancer treated with resection.  There is been no recurrence.    Benign prostatic hyperplasia with nocturia  This is a chronic problem.  Patient is on Flomax to help with BPH symptoms.  He states he gets up about 2 times at night and he is comfortable with that.  His last PSA was done 5 years ago and was slightly elevated at 4.2.    Essential hypertension  This is a chronic problem.  Patient's blood pressures well controlled on current medications.      Past Medical History:   Diagnosis Date   • Arthritis     lower back   • BPH (benign prostatic hyperplasia)    • GERD (gastroesophageal reflux disease)    • Glaucoma     in right eye, no drops   • Hay fever 2021   • Heart burn    • Hyperlipidemia    • Hypertension     well controlled on meds   • Indigestion     pt on prilosec   • Obstructive uropathy 2021       Social History     Tobacco Use   • Smoking status: Former Smoker     Packs/day: 1.00     Years: 10.00     Pack years: 10.00     Quit date: 1986     Years since quittin.8   • Smokeless tobacco: Never Used   • Tobacco comment: light smoker in past, quit about 43 yrs old.   Vaping Use   • Vaping Use: Never used   Substance Use Topics   • Alcohol use: Yes     Alcohol/week: 0.5 oz     Types: 1 Glasses of wine per week     Comment: wine every dinner   • Drug use: No       Current Outpatient Medications Ordered in Epic   Medication Sig Dispense Refill   • BROMSITE 0.075 % Solution INSTILL 1 DROP IN RIGHT EYE TWICE DAILY. START  "DROPS 2 DAYS BEFORE SURGERY     • INVELTYS 1 % Suspension SHAKE LIQUID AND INSTILL 1 DROP IN RIGHT EYE TWICE DAILY. START DROP AFTER SURGERY     • ofloxacin (OCUFLOX) 0.3 % Solution INSTILL 1 DROP IN RIGHT EYE FOUR TIMES DAILY. START DROPS 2 DAYS BEFORE SURGERY     • potassium chloride SA (KDUR) 20 MEQ Tab CR TAKE 1 TABLET BY MOUTH TWICE DAILY 180 Tablet 2   • atorvastatin (LIPITOR) 20 MG Tab TAKE 1 TABLET BY MOUTH EVERY  tablet 0   • hydroCHLOROthiazide (HYDRODIURIL) 25 MG Tab TAKE 1 TABLET BY MOUTH EVERY DAY 90 tablet 3   • omeprazole (PRILOSEC) 40 MG delayed-release capsule TAKE 1 CAPSULE BY MOUTH EVERY DAY 90 capsule 3   • tamsulosin (FLOMAX) 0.4 MG capsule TAKE ONE CAPSULE BY MOUTH EVERY DAY;HALF HOURS AFTER BREAKFAST 90 capsule 3   • metoprolol SR (TOPROL XL) 100 MG TABLET SR 24 HR TAKE 1 TABLET BY MOUTH TWICE DAILY 180 tablet 3   • Coenzyme Q10 (CO Q10) 100 MG Cap Take 300 mg by mouth every day.     • Cholecalciferol (VITAMIN D) 2000 UNITS CAPS Take  by mouth.       No current Epic-ordered facility-administered medications on file.       Allergies:  Patient has no known allergies.    Health Maintenance: Completed    ROS:  Gen: no fevers/chills, no changes in weight  Eyes: no changes in vision  ENT: no sore throat, no hearing loss, no bloody nose  Pulm: no sob, no cough  CV: no chest pain, no palpitations  GI: no nausea/vomiting, no diarrhea  : no dysuria  MSk: no myalgias  Skin: no rash  Neuro: no headaches, no numbness/tingling  Heme/Lymph: no easy bruising      Objective:       Exam:  /70 (BP Location: Right arm, Patient Position: Sitting, BP Cuff Size: Large adult)   Pulse 84   Temp 37.1 °C (98.8 °F) (Temporal)   Resp 14   Ht 1.778 m (5' 10\")   Wt 104 kg (230 lb)   SpO2 96%   BMI 33.00 kg/m²  Body mass index is 33 kg/m².    Gen: Alert and oriented, No apparent distress.      Labs: Previous lab results reviewed with patient.    Assessment & Plan:     76 y.o. male with the following - "     1. Encounter to establish care  Patient is establish care with me today.  He was to come back in June for his annual exam and labs.    2. Essential hypertension  This is a chronic problem.  Blood pressure well controlled on current medications.    3. Hypokalemia  This is a chronic problem.  Patient was told to increase his dietary intake of potassium.  If he develops any muscle cramps to let me know as we may need to add on potassium supplementation.    4. History of bladder cancer  This is a resolved issue.  There is no evidence of recurrence.    5. Benign prostatic hyperplasia with nocturia  This is a chronic problem.  Patient told we can continue with his current dose of medications as he is comfortable with only getting up twice a night.  Otherwise we can increase the Flomax or add on Avodart.  When he has his next lab test in June we will also check a PSA.    Return in about 8 months (around 6/15/2022) for Long.  21 minutes spent with the patient.  Please note that this dictation was created using voice recognition software. I have made every reasonable attempt to correct obvious errors, but I expect that there are errors of grammar and possibly content that I did not discover before finalizing the note.

## 2021-10-14 NOTE — ASSESSMENT & PLAN NOTE
Patient is here today to establish care.  He is having no problems today's visit.  His wellness and lab work are current.  His last lab did show some mild hypokalemia but he is on a diuretic.  He does not need any medication refills today.

## 2021-10-14 NOTE — ASSESSMENT & PLAN NOTE
This is a chronic issue.  Patient states he had a previous bladder cancer treated with resection.  There is been no recurrence.

## 2021-11-07 DIAGNOSIS — E78.5 DYSLIPIDEMIA: ICD-10-CM

## 2021-11-08 RX ORDER — ATORVASTATIN CALCIUM 20 MG/1
TABLET, FILM COATED ORAL
Qty: 100 TABLET | Refills: 3 | Status: SHIPPED | OUTPATIENT
Start: 2021-11-08 | End: 2022-11-04

## 2022-02-08 ENCOUNTER — PATIENT MESSAGE (OUTPATIENT)
Dept: HEALTH INFORMATION MANAGEMENT | Facility: OTHER | Age: 77
End: 2022-02-08
Payer: MEDICARE

## 2022-02-14 ENCOUNTER — TELEPHONE (OUTPATIENT)
Dept: HEALTH INFORMATION MANAGEMENT | Facility: OTHER | Age: 77
End: 2022-02-14

## 2022-05-30 DIAGNOSIS — K21.9 GASTROESOPHAGEAL REFLUX DISEASE WITHOUT ESOPHAGITIS: ICD-10-CM

## 2022-05-30 DIAGNOSIS — I10 ESSENTIAL HYPERTENSION: ICD-10-CM

## 2022-05-30 DIAGNOSIS — N40.0 BENIGN PROSTATIC HYPERPLASIA WITHOUT LOWER URINARY TRACT SYMPTOMS: ICD-10-CM

## 2022-05-31 RX ORDER — OMEPRAZOLE 40 MG/1
CAPSULE, DELAYED RELEASE ORAL
Qty: 90 CAPSULE | Refills: 1 | Status: SHIPPED | OUTPATIENT
Start: 2022-05-31 | End: 2022-11-04 | Stop reason: SDUPTHER

## 2022-05-31 RX ORDER — TAMSULOSIN HYDROCHLORIDE 0.4 MG/1
CAPSULE ORAL
Qty: 90 CAPSULE | Refills: 1 | Status: SHIPPED | OUTPATIENT
Start: 2022-05-31 | End: 2022-11-04 | Stop reason: SDUPTHER

## 2022-05-31 RX ORDER — METOPROLOL SUCCINATE 100 MG/1
TABLET, EXTENDED RELEASE ORAL
Qty: 180 TABLET | Refills: 1 | Status: SHIPPED | OUTPATIENT
Start: 2022-05-31 | End: 2022-11-04 | Stop reason: SDUPTHER

## 2022-05-31 RX ORDER — HYDROCHLOROTHIAZIDE 25 MG/1
TABLET ORAL
Qty: 90 TABLET | Refills: 1 | Status: SHIPPED | OUTPATIENT
Start: 2022-05-31 | End: 2022-11-04 | Stop reason: SDUPTHER

## 2022-10-25 ENCOUNTER — TELEPHONE (OUTPATIENT)
Dept: MEDICAL GROUP | Facility: PHYSICIAN GROUP | Age: 77
End: 2022-10-25

## 2022-10-25 DIAGNOSIS — Z12.5 PROSTATE CANCER SCREENING: ICD-10-CM

## 2022-10-25 DIAGNOSIS — I10 ESSENTIAL HYPERTENSION: ICD-10-CM

## 2022-10-25 NOTE — TELEPHONE ENCOUNTER
1. Caller Name: Juma Stern                        Call Back Number: [phone]      How would the patient prefer to be contacted with a response: [email or web address] message    2. SPECIFIC Action To Be Taken:  Pt would like to get lab orders, to complete them before of his appt with his PCP.    3. Diagnosis/Clinical Reason for Request: Lab orders    4. Specialty & Provider Name/Lab/Imaging Location: [facility].    5. Is appointment scheduled for requested order/referral: yes - 11/4/2022    Patient was informed they will receive a return phone call from the office ONLY if there are any questions before processing their request. Advised to call back if they haven't received a call from the referral department in 5 days.

## 2022-10-31 ENCOUNTER — HOSPITAL ENCOUNTER (OUTPATIENT)
Dept: LAB | Facility: MEDICAL CENTER | Age: 77
End: 2022-10-31
Attending: FAMILY MEDICINE
Payer: MEDICARE

## 2022-10-31 DIAGNOSIS — Z12.5 PROSTATE CANCER SCREENING: ICD-10-CM

## 2022-10-31 DIAGNOSIS — I10 ESSENTIAL HYPERTENSION: ICD-10-CM

## 2022-10-31 LAB
ALBUMIN SERPL BCP-MCNC: 4.5 G/DL (ref 3.2–4.9)
ALBUMIN/GLOB SERPL: 2 G/DL
ALP SERPL-CCNC: 85 U/L (ref 30–99)
ALT SERPL-CCNC: 23 U/L (ref 2–50)
ANION GAP SERPL CALC-SCNC: 11 MMOL/L (ref 7–16)
APPEARANCE UR: CLEAR
AST SERPL-CCNC: 21 U/L (ref 12–45)
BASOPHILS # BLD AUTO: 1.2 % (ref 0–1.8)
BASOPHILS # BLD: 0.1 K/UL (ref 0–0.12)
BILIRUB SERPL-MCNC: 0.9 MG/DL (ref 0.1–1.5)
BILIRUB UR QL STRIP.AUTO: NEGATIVE
BUN SERPL-MCNC: 16 MG/DL (ref 8–22)
CALCIUM SERPL-MCNC: 9.5 MG/DL (ref 8.5–10.5)
CHLORIDE SERPL-SCNC: 103 MMOL/L (ref 96–112)
CHOLEST SERPL-MCNC: 172 MG/DL (ref 100–199)
CO2 SERPL-SCNC: 28 MMOL/L (ref 20–33)
COLOR UR: YELLOW
CREAT SERPL-MCNC: 0.63 MG/DL (ref 0.5–1.4)
EOSINOPHIL # BLD AUTO: 0.58 K/UL (ref 0–0.51)
EOSINOPHIL NFR BLD: 7.2 % (ref 0–6.9)
ERYTHROCYTE [DISTWIDTH] IN BLOOD BY AUTOMATED COUNT: 45.8 FL (ref 35.9–50)
FASTING STATUS PATIENT QL REPORTED: NORMAL
GFR SERPLBLD CREATININE-BSD FMLA CKD-EPI: 98 ML/MIN/1.73 M 2
GLOBULIN SER CALC-MCNC: 2.3 G/DL (ref 1.9–3.5)
GLUCOSE SERPL-MCNC: 92 MG/DL (ref 65–99)
GLUCOSE UR STRIP.AUTO-MCNC: NEGATIVE MG/DL
HCT VFR BLD AUTO: 42.8 % (ref 42–52)
HDLC SERPL-MCNC: 48 MG/DL
HGB BLD-MCNC: 14.3 G/DL (ref 14–18)
IMM GRANULOCYTES # BLD AUTO: 0.01 K/UL (ref 0–0.11)
IMM GRANULOCYTES NFR BLD AUTO: 0.1 % (ref 0–0.9)
KETONES UR STRIP.AUTO-MCNC: ABNORMAL MG/DL
LDLC SERPL CALC-MCNC: 107 MG/DL
LEUKOCYTE ESTERASE UR QL STRIP.AUTO: NEGATIVE
LYMPHOCYTES # BLD AUTO: 2.32 K/UL (ref 1–4.8)
LYMPHOCYTES NFR BLD: 28.9 % (ref 22–41)
MCH RBC QN AUTO: 34.4 PG (ref 27–33)
MCHC RBC AUTO-ENTMCNC: 33.4 G/DL (ref 33.7–35.3)
MCV RBC AUTO: 102.9 FL (ref 81.4–97.8)
MICRO URNS: ABNORMAL
MONOCYTES # BLD AUTO: 0.8 K/UL (ref 0–0.85)
MONOCYTES NFR BLD AUTO: 10 % (ref 0–13.4)
NEUTROPHILS # BLD AUTO: 4.23 K/UL (ref 1.82–7.42)
NEUTROPHILS NFR BLD: 52.6 % (ref 44–72)
NITRITE UR QL STRIP.AUTO: NEGATIVE
NRBC # BLD AUTO: 0 K/UL
NRBC BLD-RTO: 0 /100 WBC
PH UR STRIP.AUTO: 5.5 [PH] (ref 5–8)
PLATELET # BLD AUTO: 272 K/UL (ref 164–446)
PMV BLD AUTO: 9.9 FL (ref 9–12.9)
POTASSIUM SERPL-SCNC: 4 MMOL/L (ref 3.6–5.5)
PROT SERPL-MCNC: 6.8 G/DL (ref 6–8.2)
PROT UR QL STRIP: NEGATIVE MG/DL
RBC # BLD AUTO: 4.16 M/UL (ref 4.7–6.1)
RBC UR QL AUTO: NEGATIVE
SODIUM SERPL-SCNC: 142 MMOL/L (ref 135–145)
SP GR UR STRIP.AUTO: 1.02
TRIGL SERPL-MCNC: 83 MG/DL (ref 0–149)
UROBILINOGEN UR STRIP.AUTO-MCNC: 0.2 MG/DL
WBC # BLD AUTO: 8 K/UL (ref 4.8–10.8)

## 2022-10-31 PROCEDURE — 81003 URINALYSIS AUTO W/O SCOPE: CPT

## 2022-10-31 PROCEDURE — 36415 COLL VENOUS BLD VENIPUNCTURE: CPT

## 2022-10-31 PROCEDURE — 85025 COMPLETE CBC W/AUTO DIFF WBC: CPT

## 2022-10-31 PROCEDURE — 80061 LIPID PANEL: CPT

## 2022-10-31 PROCEDURE — 80053 COMPREHEN METABOLIC PANEL: CPT

## 2022-10-31 PROCEDURE — 84153 ASSAY OF PSA TOTAL: CPT

## 2022-11-01 LAB — PSA SERPL-MCNC: 37.6 NG/ML (ref 0–4)

## 2022-11-04 ENCOUNTER — OFFICE VISIT (OUTPATIENT)
Dept: MEDICAL GROUP | Facility: PHYSICIAN GROUP | Age: 77
End: 2022-11-04
Payer: MEDICARE

## 2022-11-04 VITALS
TEMPERATURE: 97.7 F | HEART RATE: 77 BPM | DIASTOLIC BLOOD PRESSURE: 76 MMHG | HEIGHT: 70 IN | BODY MASS INDEX: 33.21 KG/M2 | RESPIRATION RATE: 18 BRPM | WEIGHT: 232 LBS | SYSTOLIC BLOOD PRESSURE: 132 MMHG | OXYGEN SATURATION: 97 %

## 2022-11-04 DIAGNOSIS — K21.9 GASTROESOPHAGEAL REFLUX DISEASE WITHOUT ESOPHAGITIS: ICD-10-CM

## 2022-11-04 DIAGNOSIS — L98.9 SKIN LESION: ICD-10-CM

## 2022-11-04 DIAGNOSIS — E66.9 OBESITY (BMI 30.0-34.9): ICD-10-CM

## 2022-11-04 DIAGNOSIS — R97.20 ELEVATED PSA: ICD-10-CM

## 2022-11-04 DIAGNOSIS — E78.5 DYSLIPIDEMIA: ICD-10-CM

## 2022-11-04 DIAGNOSIS — I10 ESSENTIAL HYPERTENSION: ICD-10-CM

## 2022-11-04 DIAGNOSIS — E87.6 HYPOKALEMIA: ICD-10-CM

## 2022-11-04 DIAGNOSIS — J30.1 HAY FEVER: ICD-10-CM

## 2022-11-04 DIAGNOSIS — N40.0 BENIGN PROSTATIC HYPERPLASIA WITHOUT LOWER URINARY TRACT SYMPTOMS: ICD-10-CM

## 2022-11-04 PROBLEM — E66.811 OBESITY (BMI 30.0-34.9): Status: ACTIVE | Noted: 2022-02-22

## 2022-11-04 PROBLEM — Z76.89 ENCOUNTER TO ESTABLISH CARE: Status: RESOLVED | Noted: 2021-10-14 | Resolved: 2022-11-04

## 2022-11-04 PROCEDURE — 99214 OFFICE O/P EST MOD 30 MIN: CPT | Performed by: FAMILY MEDICINE

## 2022-11-04 RX ORDER — METOPROLOL SUCCINATE 100 MG/1
100 TABLET, EXTENDED RELEASE ORAL 2 TIMES DAILY
Qty: 200 TABLET | Refills: 3 | Status: SHIPPED | OUTPATIENT
Start: 2022-11-04 | End: 2023-12-05

## 2022-11-04 RX ORDER — OMEPRAZOLE 40 MG/1
40 CAPSULE, DELAYED RELEASE ORAL
Qty: 100 CAPSULE | Refills: 3 | Status: SHIPPED | OUTPATIENT
Start: 2022-11-04 | End: 2023-12-05

## 2022-11-04 RX ORDER — LANOLIN ALCOHOL/MO/W.PET/CERES
500 CREAM (GRAM) TOPICAL
COMMUNITY

## 2022-11-04 RX ORDER — TAMSULOSIN HYDROCHLORIDE 0.4 MG/1
0.4 CAPSULE ORAL
Qty: 100 CAPSULE | Refills: 3 | Status: SHIPPED | OUTPATIENT
Start: 2022-11-04 | End: 2023-12-05

## 2022-11-04 RX ORDER — MONTELUKAST SODIUM 10 MG/1
10 TABLET ORAL DAILY
Qty: 30 TABLET | Refills: 2 | Status: SHIPPED | OUTPATIENT
Start: 2022-11-04 | End: 2022-12-01 | Stop reason: SDUPTHER

## 2022-11-04 RX ORDER — ATORVASTATIN CALCIUM 20 MG/1
20 TABLET, FILM COATED ORAL NIGHTLY
Qty: 100 TABLET | Refills: 3 | Status: SHIPPED | OUTPATIENT
Start: 2022-11-04 | End: 2023-11-07

## 2022-11-04 RX ORDER — ATORVASTATIN CALCIUM 20 MG/1
20 TABLET, FILM COATED ORAL NIGHTLY
COMMUNITY
End: 2022-11-04 | Stop reason: SDUPTHER

## 2022-11-04 RX ORDER — HYDROCHLOROTHIAZIDE 25 MG/1
25 TABLET ORAL
Qty: 100 TABLET | Refills: 3 | Status: SHIPPED | OUTPATIENT
Start: 2022-11-04 | End: 2023-12-05

## 2022-11-04 RX ORDER — POTASSIUM CHLORIDE 20 MEQ/1
20 TABLET, EXTENDED RELEASE ORAL 2 TIMES DAILY
Qty: 200 TABLET | Refills: 3 | Status: SHIPPED | OUTPATIENT
Start: 2022-11-04 | End: 2023-12-14

## 2022-11-04 ASSESSMENT — FIBROSIS 4 INDEX: FIB4 SCORE: 1.22

## 2022-11-04 NOTE — PROGRESS NOTES
Subjective:     CC: Patient is here for follow-up and medication refills.    HPI:   Juma presents today with the following medical concerns:    Essential hypertension  This is a chronic stable condition.  Patient is here for follow-up and needs his medication renewed.  Also to go over his lab work.    Elevated PSA  This is a chronic problem.  Patient's PSA has markedly increased since his last one was done.  I told him this could signify prostate cancer and we need to get him back to the urologist.  He is agreeable to that.    Dyslipidemia  This is a chronic problem.  Patient is on a statin.  His LDL is a little above 100 this time we will try to watch his diet a little closer.    Obesity (BMI 30.0-34.9)  This is a chronic problem.  Patient continues to watch and monitor his weight.    Gastroesophageal reflux disease without esophagitis  This is a chronic problem.  Patient started taking his omeprazole at bedtime as he was having a little bit of discomfort related to GERD and that seems to help.  Medication will be renewed.    Hay fever  This is a chronic problem.  Patient has trouble with a runny nose year-round since he moved to [place].  He is tried various over-the-counter antihistamines and nasal sprays without much relief.  After discussion he wants to try the Singulair.  We will give a 1 month as a trial.  If it does not work we could refer him to ear nose and throat or allergy clinic.    Hypokalemia  This is a chronic problem.  Last labs was normal.  Medication will be renewed.    Skin lesion  This is a chronic issue.  Patient has a lesion on the left side of his face he like looked at.  He does not think is been changing.  He would like to see a dermatologist.    Past Medical History:   Diagnosis Date    Arthritis     lower back    BPH (benign prostatic hyperplasia)     GERD (gastroesophageal reflux disease)     Glaucoma     in right eye, no drops    Hay fever 6/4/2021    Heart burn     Hyperlipidemia      Hypertension     well controlled on meds    Indigestion     pt on prilosec    Obstructive uropathy 2021       Social History     Tobacco Use    Smoking status: Former     Packs/day: 1.00     Years: 10.00     Pack years: 10.00     Types: Cigarettes     Quit date: 1986     Years since quittin.8    Smokeless tobacco: Never   Vaping Use    Vaping Use: Never used   Substance Use Topics    Alcohol use: Yes     Alcohol/week: 0.5 oz     Types: 1 Glasses of wine per week     Comment: wine every dinner    Drug use: No       Current Outpatient Medications Ordered in Epic   Medication Sig Dispense Refill    niacin  MG Cap CR Take 500 mg by mouth every 12 hours.      montelukast (SINGULAIR) 10 MG Tab Take 1 Tablet by mouth every day. 30 Tablet 2    atorvastatin (LIPITOR) 20 MG Tab Take 1 Tablet by mouth every evening. 100 Tablet 3    hydroCHLOROthiazide (HYDRODIURIL) 25 MG Tab Take 1 Tablet by mouth every day. 100 Tablet 3    metoprolol SR (TOPROL XL) 100 MG TABLET SR 24 HR Take 1 Tablet by mouth 2 times a day. 200 Tablet 3    omeprazole (PRILOSEC) 40 MG delayed-release capsule Take 1 Capsule by mouth every day. 100 Capsule 3    potassium chloride SA (KDUR) 20 MEQ Tab CR Take 1 Tablet by mouth 2 times a day. 200 Tablet 3    tamsulosin (FLOMAX) 0.4 MG capsule Take 1 Capsule by mouth every morning before breakfast. 100 Capsule 3    Coenzyme Q10 (CO Q10) 100 MG Cap Take 300 mg by mouth every day.      Cholecalciferol (VITAMIN D) 2000 UNITS CAPS Take  by mouth.       No current Epic-ordered facility-administered medications on file.       Allergies:  Patient has no known allergies.    Health Maintenance: Completed    ROS:  Gen: no fevers/chills, no changes in weight  Eyes: no changes in vision  ENT: no sore throat, no hearing loss, no bloody nose  Pulm: no sob, no cough  CV: no chest pain, no palpitations  GI: no nausea/vomiting, no diarrhea  : no dysuria  MSk: no myalgias  Skin: no rash  Neuro: no headaches, no  "numbness/tingling  Heme/Lymph: no easy bruising      Objective:       Exam:  /76 (BP Location: Left arm, Patient Position: Sitting, BP Cuff Size: Large adult)   Pulse 77   Temp 36.5 °C (97.7 °F) (Temporal)   Resp 18   Ht 1.765 m (5' 9.5\")   Wt 105 kg (232 lb)   SpO2 97%   BMI 33.77 kg/m²  Body mass index is 33.77 kg/m².    Gen: Alert and oriented, No apparent distress.  Eyes:   Extraocular motions intact.  No scleral icterus seen.  Ears:    Ear canals and TMs are clear.  Neck: Neck is supple without lymphadenopathy.  Thyroid exam is normal.  Lungs: Normal effort, CTA bilaterally, no wheezes, rhonchi, or rales  CV: Regular rate and rhythm. No murmurs, rubs, or gallops.  No carotid bruits heard.  Abdomen: Soft, nontender, no again megaly or masses.  Normal bowel sounds.  Ext: No clubbing, cyanosis, edema.  Neuro: Cranial nerves II through VIII are grossly intact.  No lateralized signs are seen.  Gait is normal.  Skin:   Patient has a benign-appearing seborrheic keratosis to the left side of the face.    Labs: Results reviewed with patient.    Assessment & Plan:     77 y.o. male with the following -     1. Skin lesion  This is a chronic benign condition.  Patient would like to see dermatology about having it removed and getting a general skin check.  Referral made.  - Referral to Dermatology    2. Elevated PSA  This is a new problem with elevation of the PSA even higher.  Be referred to urology to rule out prostate cancer.  - Referral to Urology    3. Essential hypertension  This is a chronic stable condition.  Continue to follow.  Medication renewed.  - hydroCHLOROthiazide (HYDRODIURIL) 25 MG Tab; Take 1 Tablet by mouth every day.  Dispense: 100 Tablet; Refill: 3  - metoprolol SR (TOPROL XL) 100 MG TABLET SR 24 HR; Take 1 Tablet by mouth 2 times a day.  Dispense: 200 Tablet; Refill: 3    4. Gastroesophageal reflux disease without esophagitis  Is a chronic problem.  Medication renewed.  - omeprazole " (PRILOSEC) 40 MG delayed-release capsule; Take 1 Capsule by mouth every day.  Dispense: 100 Capsule; Refill: 3    5. Hypokalemia  This is a chronic problem.  Medication renewed.  - potassium chloride SA (KDUR) 20 MEQ Tab CR; Take 1 Tablet by mouth 2 times a day.  Dispense: 200 Tablet; Refill: 3    6. Benign prostatic hyperplasia without lower urinary tract symptoms  This is a chronic problem.  Medication renewed.  - tamsulosin (FLOMAX) 0.4 MG capsule; Take 1 Capsule by mouth every morning before breakfast.  Dispense: 100 Capsule; Refill: 3    7. Obesity (BMI 30.0-34.9)  This is a chronic problem.  Continue to work on weight reduction.  - Patient identified as having weight management issue.  Appropriate orders and counseling given.    8. Dyslipidemia  This is a chronic problem.  Medication renewed.    9. Hay fever  This is a chronic problem.  He was given a prescription for Singulair to see if that would help.  He is to let us know.    Patient declined all immunizations today  Return in about 6 months (around 5/4/2023) for Long.    Please note that this dictation was created using voice recognition software. I have made every reasonable attempt to correct obvious errors, but I expect that there are errors of grammar and possibly content that I did not discover before finalizing the note.

## 2022-11-04 NOTE — ASSESSMENT & PLAN NOTE
This is a chronic issue.  Patient has a lesion on the left side of his face he like looked at.  He does not think is been changing.  He would like to see a dermatologist.

## 2022-11-04 NOTE — ASSESSMENT & PLAN NOTE
This is a chronic problem.  Patient has trouble with a runny nose year-round since he moved to [place].  He is tried various over-the-counter antihistamines and nasal sprays without much relief.  After discussion he wants to try the Singulair.  We will give a 1 month as a trial.  If it does not work we could refer him to ear nose and throat or allergy clinic.

## 2022-11-04 NOTE — ASSESSMENT & PLAN NOTE
This is a chronic problem.  Patient's PSA has markedly increased since his last one was done.  I told him this could signify prostate cancer and we need to get him back to the urologist.  He is agreeable to that.

## 2022-11-04 NOTE — ASSESSMENT & PLAN NOTE
This is a chronic problem.  Patient started taking his omeprazole at bedtime as he was having a little bit of discomfort related to GERD and that seems to help.  Medication will be renewed.

## 2022-11-04 NOTE — ASSESSMENT & PLAN NOTE
This is a chronic problem.  Patient is on a statin.  His LDL is a little above 100 this time we will try to watch his diet a little closer.

## 2022-11-04 NOTE — ASSESSMENT & PLAN NOTE
This is a chronic stable condition.  Patient is here for follow-up and needs his medication renewed.  Also to go over his lab work.

## 2022-12-01 RX ORDER — MONTELUKAST SODIUM 10 MG/1
10 TABLET ORAL DAILY
Qty: 100 TABLET | Refills: 3 | Status: SHIPPED | OUTPATIENT
Start: 2022-12-01 | End: 2024-01-08

## 2022-12-08 ENCOUNTER — DOCUMENTATION (OUTPATIENT)
Dept: HEALTH INFORMATION MANAGEMENT | Facility: OTHER | Age: 77
End: 2022-12-08
Payer: MEDICARE

## 2023-01-14 ENCOUNTER — HOSPITAL ENCOUNTER (OUTPATIENT)
Dept: RADIOLOGY | Facility: MEDICAL CENTER | Age: 78
End: 2023-01-14
Attending: UROLOGY
Payer: MEDICARE

## 2023-01-14 DIAGNOSIS — R97.20 ELEVATED PROSTATE SPECIFIC ANTIGEN (PSA): ICD-10-CM

## 2023-01-14 PROCEDURE — 700117 HCHG RX CONTRAST REV CODE 255: Performed by: UROLOGY

## 2023-01-14 PROCEDURE — 72197 MRI PELVIS W/O & W/DYE: CPT

## 2023-01-14 PROCEDURE — A9579 GAD-BASE MR CONTRAST NOS,1ML: HCPCS | Performed by: UROLOGY

## 2023-01-14 PROCEDURE — 700111 HCHG RX REV CODE 636 W/ 250 OVERRIDE (IP): Performed by: RADIOLOGY

## 2023-01-14 RX ADMIN — GADOTERIDOL 20 ML: 279.3 INJECTION, SOLUTION INTRAVENOUS at 11:04

## 2023-01-14 RX ADMIN — GLUCAGON 1 MG: 1 INJECTION, POWDER, LYOPHILIZED, FOR SOLUTION INTRAMUSCULAR; INTRAVENOUS at 09:55

## 2023-01-17 ENCOUNTER — HOSPITAL ENCOUNTER (OUTPATIENT)
Dept: LAB | Facility: MEDICAL CENTER | Age: 78
End: 2023-01-17
Attending: UROLOGY
Payer: MEDICARE

## 2023-01-17 LAB
BUN SERPL-MCNC: 20 MG/DL (ref 8–22)
CREAT SERPL-MCNC: 0.74 MG/DL (ref 0.5–1.4)
GFR SERPLBLD CREATININE-BSD FMLA CKD-EPI: 94 ML/MIN/1.73 M 2

## 2023-01-17 PROCEDURE — 82565 ASSAY OF CREATININE: CPT

## 2023-01-17 PROCEDURE — 84520 ASSAY OF UREA NITROGEN: CPT

## 2023-01-17 PROCEDURE — 36415 COLL VENOUS BLD VENIPUNCTURE: CPT

## 2023-01-18 ENCOUNTER — OFFICE VISIT (OUTPATIENT)
Dept: DERMATOLOGY | Facility: IMAGING CENTER | Age: 78
End: 2023-01-18
Payer: MEDICARE

## 2023-01-18 DIAGNOSIS — L57.0 HYPERTROPHIC ACTINIC KERATOSIS: ICD-10-CM

## 2023-01-18 DIAGNOSIS — L82.0 INFLAMED SEBORRHEIC KERATOSIS: ICD-10-CM

## 2023-01-18 PROCEDURE — 17000 DESTRUCT PREMALG LESION: CPT | Mod: 59 | Performed by: NURSE PRACTITIONER

## 2023-01-18 PROCEDURE — 17110 DESTRUCTION B9 LES UP TO 14: CPT | Performed by: NURSE PRACTITIONER

## 2023-01-18 NOTE — PROGRESS NOTES
DERMATOLOGY NOTE  NEW VISIT       Chief complaint: Follow-Up and Skin Lesion   Pt here today to Re Establish care, last seen by Dr. Berry on 05/2019.  Pt here today for lesions on bilateral temples      History of skin cancer: No  History of precancers/actinic keratoses: No  History of biopsies:Yes, Right back of neck, SK  History of blistering/severe sunburns:Yes, childhood  Family history of skin cancer:Yes, Details: father unknown type  Family history of atypical moles:No         No Known Allergies     MEDICATIONS:  Medications relevant to specialty reviewed.     REVIEW OF SYSTEMS:   Positive for skin (see HPI)  Negative for fevers and chills       EXAM:  There were no vitals taken for this visit.  Constitutional: Well-developed, well-nourished, and in no distress.     A focused skin exam was performed including the affected areas of the head (including face). Notable findings on exam today listed below and/or in assessment/plan.   ISK L temple  HAK R temple    IMPRESSION / PLAN:    1. Hypertrophic actinic keratosis  - N[facility] education/counseling  CRYOTHERAPY:  Risks (including, but not limited to: skin discoloration, redness, blister, blood blister, recurrence, need for further treatment, infection, scar) and benefits of cryotherapy discussed. Patient verbally agreed to proceed with treatment. 1 cryotherapy freeze thaw cycles of 10 seconds were applied to 1 lesion on R temple with cryac. Patient tolerated procedure well. Aftercare instructions given--no specific care needed unless irritated during healing process, can apply Vaseline with small band-aid if needed.      2. Inflamed seborrheic keratosis  - Benign-appearing nature of lesions discussed during exam.   CRYOTHERAPY:  Risks (including, but not limited to: skin discoloration, redness, blister, blood blister, recurrence, need for further treatment, infection, scar) and benefits of cryotherapy discussed. Patient verbally agreed to proceed with treatment. 1  cryotherapy freeze thaw cycles of 10 seconds were applied to 1 lesion on L temple with cryac. Patient tolerated procedure well. Aftercare instructions given--no specific care needed unless irritated during healing process, can apply Vaseline with small band-aid if needed  - Advised to continue to monitor for any return to clinic for new or concerning changes.        Discussed risks associated with LN2, Patient verbalized understanding and agrees with plan regarding the above          Please note that this dictation was created using voice recognition software. I have made every reasonable attempt to correct obvious errors, but I expect that there are errors of grammar and possibly content that I did not discover before finalizing the note.      Return to clinic in: Return for PRN. and as needed for any new or changing skin lesions.

## 2023-02-01 ENCOUNTER — HOSPITAL ENCOUNTER (OUTPATIENT)
Dept: RADIOLOGY | Facility: MEDICAL CENTER | Age: 78
End: 2023-02-01
Attending: UROLOGY
Payer: MEDICARE

## 2023-02-01 DIAGNOSIS — C61 MALIGNANT NEOPLASM OF PROSTATE (HCC): ICD-10-CM

## 2023-02-01 PROCEDURE — A9503 TC99M MEDRONATE: HCPCS

## 2023-02-02 ENCOUNTER — PRE-ADMISSION TESTING (OUTPATIENT)
Dept: ADMISSIONS | Facility: MEDICAL CENTER | Age: 78
End: 2023-02-02
Attending: UROLOGY
Payer: MEDICARE

## 2023-02-02 DIAGNOSIS — Z01.812 PRE-PROCEDURAL LABORATORY EXAMINATION: ICD-10-CM

## 2023-02-02 DIAGNOSIS — Z01.810 PRE-PROCEDURAL CARDIOVASCULAR EXAMINATION: ICD-10-CM

## 2023-02-02 LAB
ANION GAP SERPL CALC-SCNC: 7 MMOL/L (ref 7–16)
APPEARANCE UR: CLEAR
BACTERIA #/AREA URNS HPF: NEGATIVE /HPF
BILIRUB UR QL STRIP.AUTO: NEGATIVE
BUN SERPL-MCNC: 17 MG/DL (ref 8–22)
CALCIUM SERPL-MCNC: 9.2 MG/DL (ref 8.5–10.5)
CHLORIDE SERPL-SCNC: 104 MMOL/L (ref 96–112)
CO2 SERPL-SCNC: 29 MMOL/L (ref 20–33)
COLOR UR: YELLOW
CREAT SERPL-MCNC: 0.69 MG/DL (ref 0.5–1.4)
EKG IMPRESSION: NORMAL
EPI CELLS #/AREA URNS HPF: NEGATIVE /HPF
ERYTHROCYTE [DISTWIDTH] IN BLOOD BY AUTOMATED COUNT: 43.3 FL (ref 35.9–50)
GFR SERPLBLD CREATININE-BSD FMLA CKD-EPI: 96 ML/MIN/1.73 M 2
GLUCOSE SERPL-MCNC: 109 MG/DL (ref 65–99)
GLUCOSE UR STRIP.AUTO-MCNC: NEGATIVE MG/DL
HCT VFR BLD AUTO: 41 % (ref 42–52)
HGB BLD-MCNC: 13.9 G/DL (ref 14–18)
HYALINE CASTS #/AREA URNS LPF: ABNORMAL /LPF
INR PPP: 1.03 (ref 0.87–1.13)
KETONES UR STRIP.AUTO-MCNC: ABNORMAL MG/DL
LEUKOCYTE ESTERASE UR QL STRIP.AUTO: NEGATIVE
MCH RBC QN AUTO: 33.8 PG (ref 27–33)
MCHC RBC AUTO-ENTMCNC: 33.9 G/DL (ref 33.7–35.3)
MCV RBC AUTO: 99.8 FL (ref 81.4–97.8)
MICRO URNS: ABNORMAL
NITRITE UR QL STRIP.AUTO: NEGATIVE
PH UR STRIP.AUTO: 5.5 [PH] (ref 5–8)
PLATELET # BLD AUTO: 319 K/UL (ref 164–446)
PMV BLD AUTO: 9.1 FL (ref 9–12.9)
POTASSIUM SERPL-SCNC: 3.8 MMOL/L (ref 3.6–5.5)
PROT UR QL STRIP: 30 MG/DL
PROTHROMBIN TIME: 13.4 SEC (ref 12–14.6)
RBC # BLD AUTO: 4.11 M/UL (ref 4.7–6.1)
RBC # URNS HPF: ABNORMAL /HPF
RBC UR QL AUTO: ABNORMAL
SODIUM SERPL-SCNC: 140 MMOL/L (ref 135–145)
SP GR UR STRIP.AUTO: 1.02
UROBILINOGEN UR STRIP.AUTO-MCNC: 0.2 MG/DL
WBC # BLD AUTO: 7.3 K/UL (ref 4.8–10.8)
WBC #/AREA URNS HPF: ABNORMAL /HPF

## 2023-02-02 PROCEDURE — 85610 PROTHROMBIN TIME: CPT

## 2023-02-02 PROCEDURE — 85027 COMPLETE CBC AUTOMATED: CPT

## 2023-02-02 PROCEDURE — 36415 COLL VENOUS BLD VENIPUNCTURE: CPT

## 2023-02-02 PROCEDURE — 93010 ELECTROCARDIOGRAM REPORT: CPT | Performed by: INTERNAL MEDICINE

## 2023-02-02 PROCEDURE — 87086 URINE CULTURE/COLONY COUNT: CPT

## 2023-02-02 PROCEDURE — 81001 URINALYSIS AUTO W/SCOPE: CPT

## 2023-02-02 PROCEDURE — 93005 ELECTROCARDIOGRAM TRACING: CPT

## 2023-02-02 PROCEDURE — 80048 BASIC METABOLIC PNL TOTAL CA: CPT

## 2023-02-02 ASSESSMENT — FIBROSIS 4 INDEX: FIB4 SCORE: 1.22

## 2023-02-04 LAB
BACTERIA UR CULT: NORMAL
SIGNIFICANT IND 70042: NORMAL
SITE SITE: NORMAL
SOURCE SOURCE: NORMAL

## 2023-02-07 ENCOUNTER — HOSPITAL ENCOUNTER (OUTPATIENT)
Facility: MEDICAL CENTER | Age: 78
End: 2023-02-07
Attending: UROLOGY | Admitting: UROLOGY
Payer: MEDICARE

## 2023-02-07 ENCOUNTER — ANESTHESIA (OUTPATIENT)
Dept: SURGERY | Facility: MEDICAL CENTER | Age: 78
End: 2023-02-07
Payer: MEDICARE

## 2023-02-07 ENCOUNTER — ANESTHESIA EVENT (OUTPATIENT)
Dept: SURGERY | Facility: MEDICAL CENTER | Age: 78
End: 2023-02-07
Payer: MEDICARE

## 2023-02-07 VITALS
WEIGHT: 222.22 LBS | HEIGHT: 70 IN | RESPIRATION RATE: 16 BRPM | HEART RATE: 73 BPM | OXYGEN SATURATION: 98 % | DIASTOLIC BLOOD PRESSURE: 58 MMHG | BODY MASS INDEX: 31.81 KG/M2 | SYSTOLIC BLOOD PRESSURE: 120 MMHG | TEMPERATURE: 97.2 F

## 2023-02-07 LAB — PATHOLOGY CONSULT NOTE: NORMAL

## 2023-02-07 PROCEDURE — 700111 HCHG RX REV CODE 636 W/ 250 OVERRIDE (IP): Performed by: ANESTHESIOLOGY

## 2023-02-07 PROCEDURE — 700101 HCHG RX REV CODE 250: Performed by: ANESTHESIOLOGY

## 2023-02-07 PROCEDURE — 160036 HCHG PACU - EA ADDL 30 MINS PHASE I: Performed by: UROLOGY

## 2023-02-07 PROCEDURE — 160047 HCHG PACU  - EA ADDL 30 MINS PHASE II: Performed by: UROLOGY

## 2023-02-07 PROCEDURE — 160048 HCHG OR STATISTICAL LEVEL 1-5: Performed by: UROLOGY

## 2023-02-07 PROCEDURE — 88307 TISSUE EXAM BY PATHOLOGIST: CPT

## 2023-02-07 PROCEDURE — 160035 HCHG PACU - 1ST 60 MINS PHASE I: Performed by: UROLOGY

## 2023-02-07 PROCEDURE — 700105 HCHG RX REV CODE 258: Performed by: UROLOGY

## 2023-02-07 PROCEDURE — 99100 ANES PT EXTEME AGE<1 YR&>70: CPT | Performed by: ANESTHESIOLOGY

## 2023-02-07 PROCEDURE — 160025 RECOVERY II MINUTES (STATS): Performed by: UROLOGY

## 2023-02-07 PROCEDURE — 160002 HCHG RECOVERY MINUTES (STAT): Performed by: UROLOGY

## 2023-02-07 PROCEDURE — 700111 HCHG RX REV CODE 636 W/ 250 OVERRIDE (IP): Performed by: UROLOGY

## 2023-02-07 PROCEDURE — 160046 HCHG PACU - 1ST 60 MINS PHASE II: Performed by: UROLOGY

## 2023-02-07 PROCEDURE — A9270 NON-COVERED ITEM OR SERVICE: HCPCS | Performed by: ANESTHESIOLOGY

## 2023-02-07 PROCEDURE — 160028 HCHG SURGERY MINUTES - 1ST 30 MINS LEVEL 3: Performed by: UROLOGY

## 2023-02-07 PROCEDURE — 00912 ANES TRURL PX RESCJ BLDR TUM: CPT | Performed by: ANESTHESIOLOGY

## 2023-02-07 PROCEDURE — 700102 HCHG RX REV CODE 250 W/ 637 OVERRIDE(OP): Performed by: ANESTHESIOLOGY

## 2023-02-07 PROCEDURE — 160039 HCHG SURGERY MINUTES - EA ADDL 1 MIN LEVEL 3: Performed by: UROLOGY

## 2023-02-07 PROCEDURE — 160009 HCHG ANES TIME/MIN: Performed by: UROLOGY

## 2023-02-07 RX ORDER — SODIUM CHLORIDE, SODIUM LACTATE, POTASSIUM CHLORIDE, CALCIUM CHLORIDE 600; 310; 30; 20 MG/100ML; MG/100ML; MG/100ML; MG/100ML
INJECTION, SOLUTION INTRAVENOUS CONTINUOUS
Status: DISCONTINUED | OUTPATIENT
Start: 2023-02-07 | End: 2023-02-07 | Stop reason: HOSPADM

## 2023-02-07 RX ORDER — MIDAZOLAM HYDROCHLORIDE 1 MG/ML
INJECTION INTRAMUSCULAR; INTRAVENOUS PRN
Status: DISCONTINUED | OUTPATIENT
Start: 2023-02-07 | End: 2023-02-07 | Stop reason: SURG

## 2023-02-07 RX ORDER — METOCLOPRAMIDE HYDROCHLORIDE 5 MG/ML
INJECTION INTRAMUSCULAR; INTRAVENOUS PRN
Status: DISCONTINUED | OUTPATIENT
Start: 2023-02-07 | End: 2023-02-07 | Stop reason: SURG

## 2023-02-07 RX ORDER — MEPERIDINE HYDROCHLORIDE 25 MG/ML
6.25 INJECTION INTRAMUSCULAR; INTRAVENOUS; SUBCUTANEOUS
Status: DISCONTINUED | OUTPATIENT
Start: 2023-02-07 | End: 2023-02-07 | Stop reason: HOSPADM

## 2023-02-07 RX ORDER — CEFAZOLIN SODIUM 1 G/3ML
INJECTION, POWDER, FOR SOLUTION INTRAMUSCULAR; INTRAVENOUS PRN
Status: DISCONTINUED | OUTPATIENT
Start: 2023-02-07 | End: 2023-02-07 | Stop reason: SURG

## 2023-02-07 RX ORDER — OXYCODONE HCL 5 MG/5 ML
10 SOLUTION, ORAL ORAL
Status: DISCONTINUED | OUTPATIENT
Start: 2023-02-07 | End: 2023-02-07 | Stop reason: HOSPADM

## 2023-02-07 RX ORDER — HYDROMORPHONE HYDROCHLORIDE 1 MG/ML
0.4 INJECTION, SOLUTION INTRAMUSCULAR; INTRAVENOUS; SUBCUTANEOUS
Status: DISCONTINUED | OUTPATIENT
Start: 2023-02-07 | End: 2023-02-07 | Stop reason: HOSPADM

## 2023-02-07 RX ORDER — OXYCODONE HCL 5 MG/5 ML
5 SOLUTION, ORAL ORAL
Status: DISCONTINUED | OUTPATIENT
Start: 2023-02-07 | End: 2023-02-07 | Stop reason: HOSPADM

## 2023-02-07 RX ORDER — ACETAMINOPHEN 500 MG
1000 TABLET ORAL ONCE
Status: COMPLETED | OUTPATIENT
Start: 2023-02-07 | End: 2023-02-07

## 2023-02-07 RX ORDER — DIPHENHYDRAMINE HYDROCHLORIDE 50 MG/ML
12.5 INJECTION INTRAMUSCULAR; INTRAVENOUS
Status: DISCONTINUED | OUTPATIENT
Start: 2023-02-07 | End: 2023-02-07 | Stop reason: HOSPADM

## 2023-02-07 RX ORDER — HYDROMORPHONE HYDROCHLORIDE 1 MG/ML
0.1 INJECTION, SOLUTION INTRAMUSCULAR; INTRAVENOUS; SUBCUTANEOUS
Status: DISCONTINUED | OUTPATIENT
Start: 2023-02-07 | End: 2023-02-07 | Stop reason: HOSPADM

## 2023-02-07 RX ORDER — CELECOXIB 200 MG/1
200 CAPSULE ORAL ONCE
Status: COMPLETED | OUTPATIENT
Start: 2023-02-07 | End: 2023-02-07

## 2023-02-07 RX ORDER — ONDANSETRON 2 MG/ML
INJECTION INTRAMUSCULAR; INTRAVENOUS PRN
Status: DISCONTINUED | OUTPATIENT
Start: 2023-02-07 | End: 2023-02-07 | Stop reason: SURG

## 2023-02-07 RX ORDER — SODIUM CHLORIDE, SODIUM LACTATE, POTASSIUM CHLORIDE, CALCIUM CHLORIDE 600; 310; 30; 20 MG/100ML; MG/100ML; MG/100ML; MG/100ML
INJECTION, SOLUTION INTRAVENOUS CONTINUOUS
Status: ACTIVE | OUTPATIENT
Start: 2023-02-07 | End: 2023-02-07

## 2023-02-07 RX ORDER — LIDOCAINE HYDROCHLORIDE 20 MG/ML
INJECTION, SOLUTION EPIDURAL; INFILTRATION; INTRACAUDAL; PERINEURAL PRN
Status: DISCONTINUED | OUTPATIENT
Start: 2023-02-07 | End: 2023-02-07 | Stop reason: SURG

## 2023-02-07 RX ORDER — ONDANSETRON 2 MG/ML
4 INJECTION INTRAMUSCULAR; INTRAVENOUS
Status: DISCONTINUED | OUTPATIENT
Start: 2023-02-07 | End: 2023-02-07 | Stop reason: HOSPADM

## 2023-02-07 RX ORDER — HALOPERIDOL 5 MG/ML
1 INJECTION INTRAMUSCULAR
Status: DISCONTINUED | OUTPATIENT
Start: 2023-02-07 | End: 2023-02-07 | Stop reason: HOSPADM

## 2023-02-07 RX ORDER — HYDROMORPHONE HYDROCHLORIDE 1 MG/ML
0.2 INJECTION, SOLUTION INTRAMUSCULAR; INTRAVENOUS; SUBCUTANEOUS
Status: DISCONTINUED | OUTPATIENT
Start: 2023-02-07 | End: 2023-02-07 | Stop reason: HOSPADM

## 2023-02-07 RX ADMIN — PROPOFOL 50 MG: 10 INJECTION, EMULSION INTRAVENOUS at 11:10

## 2023-02-07 RX ADMIN — CELECOXIB 200 MG: 200 CAPSULE ORAL at 10:17

## 2023-02-07 RX ADMIN — SODIUM CHLORIDE, POTASSIUM CHLORIDE, SODIUM LACTATE AND CALCIUM CHLORIDE: 600; 310; 30; 20 INJECTION, SOLUTION INTRAVENOUS at 10:16

## 2023-02-07 RX ADMIN — GEMCITABINE 1000 MG: 38 INJECTION, POWDER, LYOPHILIZED, FOR SOLUTION INTRAVENOUS at 11:25

## 2023-02-07 RX ADMIN — FENTANYL CITRATE 50 MCG: 50 INJECTION, SOLUTION INTRAMUSCULAR; INTRAVENOUS at 11:10

## 2023-02-07 RX ADMIN — METOCLOPRAMIDE 10 MG: 5 INJECTION, SOLUTION INTRAMUSCULAR; INTRAVENOUS at 11:12

## 2023-02-07 RX ADMIN — CEFAZOLIN 2 G: 330 INJECTION, POWDER, FOR SOLUTION INTRAMUSCULAR; INTRAVENOUS at 11:06

## 2023-02-07 RX ADMIN — FENTANYL CITRATE 50 MCG: 50 INJECTION, SOLUTION INTRAMUSCULAR; INTRAVENOUS at 10:59

## 2023-02-07 RX ADMIN — ONDANSETRON 4 MG: 2 INJECTION INTRAMUSCULAR; INTRAVENOUS at 11:12

## 2023-02-07 RX ADMIN — ACETAMINOPHEN 1000 MG: 500 TABLET, FILM COATED ORAL at 10:17

## 2023-02-07 RX ADMIN — LIDOCAINE HYDROCHLORIDE 100 MG: 20 INJECTION, SOLUTION EPIDURAL; INFILTRATION; INTRACAUDAL at 10:59

## 2023-02-07 RX ADMIN — MIDAZOLAM HYDROCHLORIDE 1 MG: 1 INJECTION, SOLUTION INTRAMUSCULAR; INTRAVENOUS at 10:56

## 2023-02-07 RX ADMIN — PROPOFOL 150 MG: 10 INJECTION, EMULSION INTRAVENOUS at 10:59

## 2023-02-07 ASSESSMENT — FIBROSIS 4 INDEX: FIB4 SCORE: 1.04

## 2023-02-07 NOTE — ANESTHESIA PREPROCEDURE EVALUATION
Case: [id number] Date/Time: 02/07/23 1045    Procedures:       MONOPOLAR TRANSURETHRAL RESECTION OF BLADDER TUMOR (Bladder)      CYSTOSCOPY, WITH BLADDER BIOPSY AND INSTILLATION OF GEMCITIBINE (Bladder)    Anesthesia type: General    Pre-op diagnosis: MASS OF URINARY BLADDER    Location: [facility] OR 18 / SURGERY [facility]    Surgeons: Colin Gomez M.D.      78yo male c h/o HTN, GERD, snoring, for TUR-BT    Relevant Problems   CARDIAC   (positive) Essential hypertension      GI   (positive) Gastroesophageal reflux disease without esophagitis       Physical Exam    Airway   Mallampati: I  TM distance: >3 FB  Neck ROM: full       Cardiovascular - normal exam  Rhythm: regular  Rate: normal  (-) murmur     Dental - normal exam           Pulmonary - normal exam  Breath sounds clear to auscultation     Abdominal    Neurological - normal exam                 Anesthesia Plan    ASA 2       Plan - general       Airway plan will be ETT          Induction: intravenous    Postoperative Plan: Postoperative administration of opioids is intended.    Pertinent diagnostic labs and testing reviewed    Informed Consent:    Anesthetic plan and risks discussed with patient.    Use of blood products discussed with: patient whom consented to blood products.

## 2023-02-07 NOTE — PROGRESS NOTES
"Pharmacy Chemotherapy verification:       Dx: Bladder Cancer        Protocol: Intravesical Gemcitabine     Dosing Reference:  Gemcitabine 2000 mg in  ml instilled intravesically within 3 hours after TURBT  Shi WANG, et al. Effect of Intravesical Instillation of Gemcitabine vs Saline Immediately Following Resection of Suspected Low-Grade Non-Muscle-Invasive Bladder Cancer on Tumor Recurrence SWOG  Randomized Clinical Trial. ZEN. 2018;319(18):4791-0082     Ht 1.778 m (5' 10\")   Wt 105 kg (230 lb 9.6 oz)   BMI 33.09 kg/m²     Body surface area is 2.28 meters squared.    Allergies: Patient has no known allergies.  No labs required     Drug Order   (Drug name, dose, route, IV Fluid & volume, frequency, number of doses) Cycle: 1 gemzar      Previous treatment: TURBT w/ mitomycin 2016      Medication = Gemcitabine  Base Dose= 2000 mg total  Calc Dose: No calc required  Final Dose = 2000 mg total split into 1000 mg x 2 in cath tip syringe  Route = Intravesical  Fluid & Volume = NS 50 mL in cath tip syringe each  Administered by physician    To be administered by MD grant to treat with final dose      By my signature below, I confirm this process was performed independently with the BSA and all final chemotherapy dosing calculations congruent. I have reviewed the above chemotherapy order and that my calculation of the final dose and BSA (when applicable) corroborate those calculations of the  pharmacist. Discrepancies of 10% or greater in the written dose have been addressed and documented within the [facility] Progress notes.    Ashlee Driscoll, PharmD, BCOP     "

## 2023-02-07 NOTE — OR NURSING
Patient recovered well post op. A&Ox4. VSS, room air. Surgical sites CDI. Surgical pain managed. Discharge orders followed for galeana cath care Patient able to drink fluids without Nausea and vomiting. PT belongings in phase two. Spouse updated and discussed plan of care. Report called to Nnamdi BOYD .

## 2023-02-07 NOTE — ANESTHESIA TIME REPORT
Anesthesia Start and Stop Event Times     Date Time Event    2/7/2023 1023 Ready for Procedure     1056 Anesthesia Start     1137 Anesthesia Stop        Responsible Staff  02/07/23    Name Role Begin End    Amanda Cody M.D. Anesth 1056 1137        Overtime Reason:  no overtime (within assigned shift)    Comments:

## 2023-02-07 NOTE — PROGRESS NOTES
"Pharmacy Chemotherapy calculation:    Pt Name: Juma Stern  DX: Bladder Cancer    Cycle 1  Previous treatment = n/a    Regimen: Intravesical Gemcitabine  Gemcitabine 2000 mg in  ml instilled intravesically within 3 hours after TURBT  Shi WANG, et al. Effect of Intravesical Instillation of Gemcitabine vs Saline Immediately Following Resection of Suspected Low-Grade Non-Muscle-Invasive Bladder Cancer on Tumor Recurrence SWOG  Randomized Clinical Trial. ZEN. 2018;319(18):0855-0435       Ht 1.778 m (5' 10\")   Wt 105 kg (230 lb 9.6 oz)   BMI 33.09 kg/m²   Body surface area is 2.28 meters squared.  LABS: not required       Gemcitabine 1000 mg in NS 50ml intravesically via cath tipped syringe x 2 syringes   Fixed dose, no calculation required. To be administered by MD. Alka Aponte, PharmD    "

## 2023-02-07 NOTE — DISCHARGE INSTRUCTIONS
HOME CARE INSTRUCTIONS    ACTIVITY: Rest and take it easy for the first 24 hours.  A responsible adult is recommended to remain with you during that time.  It is normal to feel sleepy.  We encourage you to not do anything that requires balance, judgment or coordination.    FOR 24 HOURS DO NOT:  Drive, operate machinery or run household appliances.  Drink beer or alcoholic beverages.  Make important decisions or sign legal documents.    SPECIAL INSTRUCTIONS:     Transurethral Resection of Bladder Tumor, Care After  This sheet gives you information about how to care for yourself after your procedure. Your health care provider may also give you more specific instructions. If you have problems or questions, contact your health care provider.  What can I expect after the procedure?  After the procedure, it is common to have:  A small amount of blood in your urine for up to 2 weeks.  Soreness or mild pain from your catheter. After your catheter is removed, you may have mild soreness, especially when urinating.  Pain in your lower abdomen.  Follow these instructions at home:  Medicines  Take over-the-counter and prescription medicines only as told by your health care provider.  If you were prescribed an antibiotic medicine, take it as told by your health care provider. Do not stop taking the antibiotic even if you start to feel better.  Do not drive for 24 hours if you were given a sedative during your procedure.  Ask your health care provider if the medicine prescribed to you:  Requires you to avoid driving or using heavy machinery.  Can cause constipation. You may need to take these actions to prevent or treat constipation:  Take over-the-counter or prescription medicines.  Eat foods that are high in fiber, such as beans, whole grains, and fresh fruits and vegetables.  Limit foods that are high in fat and processed sugars, such as fried or sweet foods.  Activity  Return to your normal activities as told by your health  care provider. Ask your health care provider what activities are safe for you.  Do not lift anything that is heavier than 10 lb (4.5 kg), or the limit that you are told, until your health care provider says that it is safe.  Avoid intense physical activity for as long as told by your health care provider.  Rest as told by your health care provider.  Avoid sitting for a long time without moving. Get up to take short walks every 1-2 hours. This is important to improve blood flow and breathing. Ask for help if you feel weak or unsteady.  General instructions  Do not drink alcohol for as long as told by your health care provider. This is especially important if you are taking prescription pain medicines.  Do not take baths, swim, or use a hot tub until your health care provider approves. Ask your health care provider if you may take showers. You may only be allowed to take sponge baths.  If you have a catheter, follow instructions from your health care provider about caring for your catheter and your drainage bag.  Drink enough fluid to keep your urine pale yellow.  Wear compression stockings as told by your health care provider. These stockings help to prevent blood clots and reduce swelling in your legs.  Keep all follow-up visits as told by your health care provider. This is important.  You will need to be followed closely with regular checks of your bladder and urethra (cystoscopies) to make sure that the cancer does not come back.  Contact a health care provider if:  You have pain that gets worse or does not improve with medicine.  You have blood in your urine for more than 2 weeks.  You have cloudy or bad-smelling urine.  You become constipated. Signs of constipation may include having:  Fewer than three bowel movements in a week.  Difficulty having a bowel movement.  Stools that are dry, hard, or larger than normal.  You have a fever.  Get help right away if:  You have:  Severe pain.  Bright red blood in your  urine.  Blood clots in your urine.  A lot of blood in your urine.  Your catheter has been removed and you are not able to urinate.  You have a catheter in place and the catheter is not draining urine.  Summary  After your procedure, it is common to have a small amount of blood in your urine, soreness or mild pain from your catheter, and pain in your lower abdomen.  Take over-the-counter and prescription medicines only as told by your health care provider.  Rest as told by your health care provider. Follow your health care provider's instructions about returning to normal activities. Ask what activities are safe for you.  If you have a catheter, follow instructions from your health care provider about caring for your catheter and your drainage bag.  Get help right away if you cannot urinate, you have severe pain, or you have bright red blood or blood clots in your urine.  This information is not intended to replace advice given to you by your health care provider. Make sure you discuss any questions you have with your health care provider.  Document Released: 11/28/2016 Document Revised: 07/18/2019 Document Reviewed: 07/18/2019  [employer] Patient Education © 2020 [employer]      DIET: To avoid nausea, slowly advance diet as tolerated, avoiding spicy or greasy foods for the first day.  Add more substantial food to your diet according to your physician's instructions.  Babies can be fed formula or breast milk as soon as they are hungry.  INCREASE FLUIDS AND FIBER TO AVOID CONSTIPATION.      MEDICATIONS: Resume taking daily medication.  Take prescribed pain medication with food.  If no medication is prescribed, you may take non-aspirin pain medication if needed.  PAIN MEDICATION CAN BE VERY CONSTIPATING.  Take a stool softener or laxative such as senokot, pericolace, or milk of magnesia if needed.    A follow-up appointment should be arranged with your doctor; call to schedule.    You should CALL YOUR PHYSICIAN if you  develop:  Fever greater than 101 degrees F.  Pain not relieved by medication, or persistent nausea or vomiting.  Excessive bleeding (blood soaking through dressing) or unexpected drainage from the wound.  Extreme redness or swelling around the incision site, drainage of pus or foul smelling drainage.  Inability to urinate or empty your bladder within 8 hours.  Problems with breathing or chest pain.    You should call 911 if you develop problems with breathing or chest pain.  If you are unable to contact your doctor or surgical center, you should go to the nearest emergency room or urgent care center.  Physician's telephone #: [phone]    MILD FLU-LIKE SYMPTOMS ARE NORMAL.  YOU MAY EXPERIENCE GENERALIZED MUSCLE ACHES, THROAT IRRITATION, HEADACHE AND/OR SOME NAUSEA.    If any questions arise, call your doctor.  If your doctor is not available, please feel free to call the Surgical Center at [phone].  The Center is open Monday through Friday from 7AM to 7PM.      A registered nurse may call you a few days after your surgery to see how you are doing after your procedure.    You may also receive a survey in the mail within the next two weeks and we ask that you take a few moments to complete the survey and return it to us.  Our goal is to provide you with very good care and we value your comments.     Depression / Suicide Risk    As you are discharged from this [facility] facility, it is important to learn how to keep safe from harming yourself.    Recognize the warning signs:  Abrupt changes in personality, positive or negative- including increase in energy   Giving away possessions  Change in eating patterns- significant weight changes-  positive or negative  Change in sleeping patterns- unable to sleep or sleeping all the time   Unwillingness or inability to communicate  Depression  Unusual sadness, discouragement and loneliness  Talk of wanting to die  Neglect of personal appearance   Rebelliousness-  reckless behavior  Withdrawal from people/activities they love  Confusion- inability to concentrate     If you or a loved one observes any of these behaviors or has concerns about self-harm, here's what you can do:  Talk about it- your feelings and reasons for harming yourself  Remove any means that you might use to hurt yourself (examples: pills, rope, extension cords, firearm)  Get professional help from the community (Mental Health, Substance Abuse, psychological counseling)  Do not be alone:Call your Safe Contact- someone whom you trust who will be there for you.  Call your local CRISIS HOTLINE 122-2317 or 775-200-2411  Call your local Children's Mobile Crisis Response Team [place] (685) 285-1492 or www.[employer]  Call the toll free National Suicide Prevention Hotlines   National Suicide Prevention Lifeline 762-064-XNFK (0818)  National Hope Line Network 800-SUICIDE (990-6197)    I acknowledge receipt and understanding of these Home Care instructions.

## 2023-02-07 NOTE — ANESTHESIA POSTPROCEDURE EVALUATION
Patient: Juma Stern    Procedure Summary     Date: 02/07/23 Room / Location: [place] [address] / SURGERY [facility]    Anesthesia Start: 1056 Anesthesia Stop: 1137    Procedures:       MONOPOLAR TRANSURETHRAL RESECTION OF BLADDER TUMOR (Bladder)      CYSTOSCOPY, WITH BLADDER BIOPSY AND INSTILLATION OF GEMCITIBINE (Bladder) Diagnosis: (MASS OF URINARY BLADDER)    Surgeons: Colin Gomez M.D. Responsible Provider: Amanda Cody M.D.    Anesthesia Type: general ASA Status: 2          Final Anesthesia Type: general  Last vitals  BP   Blood Pressure : (!) 142/65    Temp   36.2 °C (97.2 °F)    Pulse   64   Resp   16    SpO2   96 %      Anesthesia Post Evaluation    Patient location during evaluation: PACU  Patient participation: complete - patient participated  Level of consciousness: awake and alert    Airway patency: patent  Anesthetic complications: no  Cardiovascular status: hemodynamically stable  Respiratory status: acceptable  Hydration status: euvolemic    PONV: none          There were no known notable events for this encounter.     Nurse Pain Score: 0 (NPRS)

## 2023-02-07 NOTE — OR NURSING
Patient arrived to phase 2 alert and oriented x4. Vitals stable at this time. No complaints of pain or nausea. Patient is able to transfer to chair with stand-by assistance. Discharge order in place. Patient changed into clothes form home without complication. Urinated x1 in phase 2. Discharge instructions given. Questions answered. Patient and family state understanding of instructions. PIV removed, bleeding controlled, dressing placed. Patient discharged via wheelchair in stable condition .

## 2023-02-07 NOTE — OP REPORT
DATE OF SERVICE:  02/07/2023     PREOPERATIVE DIAGNOSIS:  Carcinoma of bladder.     POSTOPERATIVE DIAGNOSIS:  Carcinoma of bladder.     PROCEDURE:  1.  Transurethral resection of 2-cm bladder cancer.  2.  Intravesical instillation of chemotherapeutic agent.     DESCRIPTION OF PROCEDURE:  The patient was brought to the operating room, was   given a general anesthetic, placed in lithotomy position, prepped and draped   in sterile fashion.  Cystoscopy was carried, shows normal anterior bulbar   urethra.  Prostatic urethra was normal.  Cystoscopy was carried out just   inside the bladder neck on the right side lateral to the right hemitrigone,   there is superficial frondular tissue consistent with a very superficial low   volume carcinoma going over course of about 2 cm.  At this point, I felt that   we could resect this with cold cup biopsy forceps through the cystoscope to   limit any trauma to the urethra.  With a cold cup biopsy forceps, this   urothelium along this area is biopsied to resect it and is sent to pathology.    Using a Bugbee, the area of resection and surrounding urothelium is carefully   cauterized.  Cautery was taken up to around the right ureteral orifice, but   the right ureteral orifice was left intact.  A careful examination of the   remainder of the bladder again shows no other abnormalities.  Catheter was   then placed in the bladder.  Bladder was emptied.  We then filled bladder, 200   mg of gemcitabine intravesical chemotherapeutic agent to be left indwelling   for 1 hour.  He is sent to recovery in stable condition.     FINAL DIAGNOSES:  Status post transurethral resection of 2-cm bladder   carcinoma with intravesical gemcitabine instillation.              ______________________________  MD JAZMIN Julien/DELIA    DD:  02/07/2023 11:38  DT:  02/07/2023 12:33    Job#:  [id number]

## 2023-02-26 SDOH — ECONOMIC STABILITY: FOOD INSECURITY: WITHIN THE PAST 12 MONTHS, THE FOOD YOU BOUGHT JUST DIDN'T LAST AND YOU DIDN'T HAVE MONEY TO GET MORE.: NEVER TRUE

## 2023-02-26 SDOH — ECONOMIC STABILITY: FOOD INSECURITY: WITHIN THE PAST 12 MONTHS, YOU WORRIED THAT YOUR FOOD WOULD RUN OUT BEFORE YOU GOT MONEY TO BUY MORE.: NEVER TRUE

## 2023-02-26 SDOH — ECONOMIC STABILITY: HOUSING INSECURITY
IN THE LAST 12 MONTHS, WAS THERE A TIME WHEN YOU DID NOT HAVE A STEADY PLACE TO SLEEP OR SLEPT IN A SHELTER (INCLUDING NOW)?: PATIENT REFUSED

## 2023-02-26 SDOH — ECONOMIC STABILITY: TRANSPORTATION INSECURITY
IN THE PAST 12 MONTHS, HAS LACK OF TRANSPORTATION KEPT YOU FROM MEETINGS, WORK, OR FROM GETTING THINGS NEEDED FOR DAILY LIVING?: NO

## 2023-02-26 SDOH — HEALTH STABILITY: PHYSICAL HEALTH: ON AVERAGE, HOW MANY MINUTES DO YOU ENGAGE IN EXERCISE AT THIS LEVEL?: 20 MIN

## 2023-02-26 SDOH — HEALTH STABILITY: PHYSICAL HEALTH: ON AVERAGE, HOW MANY DAYS PER WEEK DO YOU ENGAGE IN MODERATE TO STRENUOUS EXERCISE (LIKE A BRISK WALK)?: 3 DAYS

## 2023-02-26 SDOH — HEALTH STABILITY: MENTAL HEALTH
STRESS IS WHEN SOMEONE FEELS TENSE, NERVOUS, ANXIOUS, OR CAN'T SLEEP AT NIGHT BECAUSE THEIR MIND IS TROUBLED. HOW STRESSED ARE YOU?: NOT AT ALL

## 2023-02-26 SDOH — ECONOMIC STABILITY: TRANSPORTATION INSECURITY
IN THE PAST 12 MONTHS, HAS THE LACK OF TRANSPORTATION KEPT YOU FROM MEDICAL APPOINTMENTS OR FROM GETTING MEDICATIONS?: NO

## 2023-02-26 SDOH — ECONOMIC STABILITY: TRANSPORTATION INSECURITY
IN THE PAST 12 MONTHS, HAS LACK OF RELIABLE TRANSPORTATION KEPT YOU FROM MEDICAL APPOINTMENTS, MEETINGS, WORK OR FROM GETTING THINGS NEEDED FOR DAILY LIVING?: NO

## 2023-02-26 SDOH — ECONOMIC STABILITY: INCOME INSECURITY: HOW HARD IS IT FOR YOU TO PAY FOR THE VERY BASICS LIKE FOOD, HOUSING, MEDICAL CARE, AND HEATING?: NOT HARD AT ALL

## 2023-02-26 SDOH — ECONOMIC STABILITY: HOUSING INSECURITY: IN THE LAST 12 MONTHS, HOW MANY PLACES HAVE YOU LIVED?: 1

## 2023-02-26 SDOH — ECONOMIC STABILITY: INCOME INSECURITY: IN THE LAST 12 MONTHS, WAS THERE A TIME WHEN YOU WERE NOT ABLE TO PAY THE MORTGAGE OR RENT ON TIME?: PATIENT REFUSED

## 2023-02-26 ASSESSMENT — SOCIAL DETERMINANTS OF HEALTH (SDOH)
HOW OFTEN DO YOU ATTEND CHURCH OR RELIGIOUS SERVICES?: PATIENT DECLINED
IN A TYPICAL WEEK, HOW MANY TIMES DO YOU TALK ON THE PHONE WITH FAMILY, FRIENDS, OR NEIGHBORS?: THREE TIMES A WEEK
DO YOU BELONG TO ANY CLUBS OR ORGANIZATIONS SUCH AS CHURCH GROUPS UNIONS, FRATERNAL OR ATHLETIC GROUPS, OR SCHOOL GROUPS?: NO
HOW HARD IS IT FOR YOU TO PAY FOR THE VERY BASICS LIKE FOOD, HOUSING, MEDICAL CARE, AND HEATING?: NOT HARD AT ALL
HOW OFTEN DO YOU GET TOGETHER WITH FRIENDS OR RELATIVES?: PATIENT DECLINED
HOW MANY DRINKS CONTAINING ALCOHOL DO YOU HAVE ON A TYPICAL DAY WHEN YOU ARE DRINKING: 1 OR 2
ARE YOU MARRIED, WIDOWED, DIVORCED, SEPARATED, NEVER MARRIED, OR LIVING WITH A PARTNER?: PATIENT DECLINED
WITHIN THE PAST 12 MONTHS, YOU WORRIED THAT YOUR FOOD WOULD RUN OUT BEFORE YOU GOT THE MONEY TO BUY MORE: NEVER TRUE
IN A TYPICAL WEEK, HOW MANY TIMES DO YOU TALK ON THE PHONE WITH FAMILY, FRIENDS, OR NEIGHBORS?: THREE TIMES A WEEK
HOW OFTEN DO YOU ATTENT MEETINGS OF THE CLUB OR ORGANIZATION YOU BELONG TO?: NEVER
HOW OFTEN DO YOU HAVE A DRINK CONTAINING ALCOHOL: 4 OR MORE TIMES A WEEK
HOW OFTEN DO YOU HAVE SIX OR MORE DRINKS ON ONE OCCASION: NEVER
DO YOU BELONG TO ANY CLUBS OR ORGANIZATIONS SUCH AS CHURCH GROUPS UNIONS, FRATERNAL OR ATHLETIC GROUPS, OR SCHOOL GROUPS?: NO
HOW OFTEN DO YOU ATTENT MEETINGS OF THE CLUB OR ORGANIZATION YOU BELONG TO?: NEVER
ARE YOU MARRIED, WIDOWED, DIVORCED, SEPARATED, NEVER MARRIED, OR LIVING WITH A PARTNER?: PATIENT DECLINED
HOW OFTEN DO YOU GET TOGETHER WITH FRIENDS OR RELATIVES?: PATIENT DECLINED
HOW OFTEN DO YOU ATTEND CHURCH OR RELIGIOUS SERVICES?: PATIENT DECLINED

## 2023-02-26 ASSESSMENT — LIFESTYLE VARIABLES
AUDIT-C TOTAL SCORE: 4
HOW OFTEN DO YOU HAVE A DRINK CONTAINING ALCOHOL: 4 OR MORE TIMES A WEEK
HOW MANY STANDARD DRINKS CONTAINING ALCOHOL DO YOU HAVE ON A TYPICAL DAY: 1 OR 2
HOW OFTEN DO YOU HAVE SIX OR MORE DRINKS ON ONE OCCASION: NEVER
SKIP TO QUESTIONS 9-10: 1

## 2023-03-02 ENCOUNTER — OFFICE VISIT (OUTPATIENT)
Dept: MEDICAL GROUP | Facility: PHYSICIAN GROUP | Age: 78
End: 2023-03-02
Payer: MEDICARE

## 2023-03-02 VITALS
DIASTOLIC BLOOD PRESSURE: 68 MMHG | SYSTOLIC BLOOD PRESSURE: 124 MMHG | HEART RATE: 88 BPM | HEIGHT: 70 IN | WEIGHT: 228.7 LBS | TEMPERATURE: 98.4 F | RESPIRATION RATE: 18 BRPM | OXYGEN SATURATION: 96 % | BODY MASS INDEX: 32.74 KG/M2

## 2023-03-02 DIAGNOSIS — I10 ESSENTIAL HYPERTENSION: ICD-10-CM

## 2023-03-02 DIAGNOSIS — R01.1 MURMUR: ICD-10-CM

## 2023-03-02 DIAGNOSIS — C61 CARCINOMA OF PROSTATE (HCC): ICD-10-CM

## 2023-03-02 DIAGNOSIS — E66.9 OBESITY (BMI 30.0-34.9): ICD-10-CM

## 2023-03-02 PROBLEM — R97.20 ELEVATED PSA: Status: RESOLVED | Noted: 2022-11-04 | Resolved: 2023-03-02

## 2023-03-02 PROCEDURE — 99214 OFFICE O/P EST MOD 30 MIN: CPT | Performed by: FAMILY MEDICINE

## 2023-03-02 ASSESSMENT — FIBROSIS 4 INDEX: FIB4 SCORE: 1.04

## 2023-03-02 ASSESSMENT — PATIENT HEALTH QUESTIONNAIRE - PHQ9: CLINICAL INTERPRETATION OF PHQ2 SCORE: 0

## 2023-03-02 NOTE — ASSESSMENT & PLAN NOTE
This is a new problem.  When last seen he was noted to have elevated PSA.  He saw the urologist who did biopsies.  He was found to have prostate cancer.  He is now received 2 hormone shots yesterday and is getting prepped to start on radiation treatment.

## 2023-03-02 NOTE — PROGRESS NOTES
Annual Health Assessment Questions:    1.  Are you currently engaging in any exercise or physical activity? Yes    2.  How would you describe your mood or emotional well-being today? good    3.  Have you had any falls in the last year? No    4.  Have you noticed any problems with your balance or had difficulty walking? No    5.  In the last six months have you experienced any leakage of urine? No    6. DPA/Advanced Directive: Patient has Living Will, but it is not on file. Instructed to bring in a copy to scan into their chart.

## 2023-03-02 NOTE — ASSESSMENT & PLAN NOTE
This is likely a chronic problem.  Patient states has been told he had a murmur in the past.  The last visit I had I did not hear it but the one before there was a very soft murmur.  He is not having symptoms.  To the best of his knowledge he has never had an echocardiogram.

## 2023-03-02 NOTE — PROGRESS NOTES
Subjective:     CC: Here for 6-month follow-up.  States he is doing very well on today's visit.  He is starting treatment for prostate cancer.    HPI:   Juma presents today with the following medical concern:    Essential hypertension  This is a chronic stable condition.  Patient is doing well on his current medications.    Carcinoma of prostate (HCC)  This is a new problem.  When last seen he was noted to have elevated PSA.  He saw the urologist who did biopsies.  He was found to have prostate cancer.  He is now received 2 hormone shots yesterday and is getting prepped to start on radiation treatment.    Murmur  This is likely a chronic problem.  Patient states has been told he had a murmur in the past.  The last visit I had I did not hear it but the one before there was a very soft murmur.  He is not having symptoms.  To the best of his knowledge he has never had an echocardiogram.    Obesity (BMI 30.0-34.9)  This is a chronic problem.  Continue to encourage weight reduction.    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    Cataract     GERD (gastroesophageal reflux disease)     Glaucoma     in right eye, no drops    Hay fever 2021    Heart burn     Hyperlipidemia     Hypertension     well controlled on meds    Indigestion     pt on prilosec    Obstructive uropathy 2021    Urinary bladder disorder 2017    Tumor Removed       Social History     Tobacco Use    Smoking status: Former     Packs/day: 1.00     Years: 10.00     Pack years: 10.00     Types: Cigarettes     Quit date: 1986     Years since quittin.1    Smokeless tobacco: Never    Tobacco comments:     Early 20's, then quit for yrs, started up again, finally quit for good.   Vaping Use    Vaping Use: Never used   Substance Use Topics    Alcohol use: Yes     Alcohol/week: 0.6 oz     Types: 1 Glasses of wine per week      "Comment: wine every dinner    Drug use: Never       Current Outpatient Medications Ordered in Epic   Medication Sig Dispense Refill    Multiple Vitamin (MULTIVITAMIN ADULT PO) Take 1 Tablet by mouth every morning.      montelukast (SINGULAIR) 10 MG Tab Take 1 Tablet by mouth every day. 100 Tablet 3    niacin  MG Cap CR Take 500 mg by mouth at bedtime.      atorvastatin (LIPITOR) 20 MG Tab Take 1 Tablet by mouth every evening. 100 Tablet 3    hydroCHLOROthiazide (HYDRODIURIL) 25 MG Tab Take 1 Tablet by mouth every day. 100 Tablet 3    metoprolol SR (TOPROL XL) 100 MG TABLET SR 24 HR Take 1 Tablet by mouth 2 times a day. 200 Tablet 3    omeprazole (PRILOSEC) 40 MG delayed-release capsule Take 1 Capsule by mouth every day. 100 Capsule 3    potassium chloride SA (KDUR) 20 MEQ Tab CR Take 1 Tablet by mouth 2 times a day. 200 Tablet 3    tamsulosin (FLOMAX) 0.4 MG capsule Take 1 Capsule by mouth every morning before breakfast. 100 Capsule 3    Coenzyme Q10 (CO Q10) 100 MG Cap Take 300 mg by mouth every morning.      Cholecalciferol (VITAMIN D) 2000 UNITS CAPS Take 2,000 Units by mouth every morning.       No current [facility]-ordered facility-administered medications on file.       Allergies:  Patient has no known allergies.    Health Maintenance: Completed    ROS:  Gen: no fevers/chills, no changes in weight  Eyes: no changes in vision  ENT: no sore throat, no hearing loss, no bloody nose  Pulm: no sob, no cough  CV: no chest pain, no palpitations  GI: no nausea/vomiting, no diarrhea  : no dysuria  MSk: no myalgias  Skin: no rash  Neuro: no headaches, no numbness/tingling  Heme/Lymph: no easy bruising      Objective:       Exam:  /68 (BP Location: Right arm, Patient Position: Sitting, BP Cuff Size: Adult)   Pulse 88   Temp 36.9 °C (98.4 °F) (Temporal)   Resp 18   Ht 1.778 m (5' 10\")   Wt 104 kg (228 lb 11.2 oz)   SpO2 96%   BMI 32.82 kg/m²  Body mass index is 32.82 kg/m².    Gen: Alert and oriented, No " apparent distress.  Neck: Neck is supple without lymphadenopathy.  Lungs: Normal effort, CTA bilaterally, no wheezes, rhonchi, or rales  CV: Regular rate and rhythm. No  rubs, or gallops.  1/6 to 2/6 systolic murmur.  Abdomen: Benign  Ext: No clubbing, cyanosis, edema.  Neuro: Cranial nerves II through VIII are grossly intact.  No lateralizing signs are seen.    Labs: Previous results reviewed.  Next set of labs will be due in 6 months.    Assessment & Plan:     77 y.o. male with the following -     1. Murmur  This is possibly a old finding.  We will continue to monitor.  We discussed symptoms and if those develop return to clinic sooner.  We will do an echo if he develops symptoms or it gets louder.    2. Carcinoma of prostate (HCC)  This is a new problem.  Continue care through the urology service.    3. Essential hypertension  This is a chronic stable condition.  Continue current medications.    4. Obesity (BMI 30.0-34.9)  This is a chronic problem.  Continue to work on weight reduction.  - Patient identified as having weight management issue.  Appropriate orders and counseling given.      Return in about 6 months (around 9/2/2023) for Long.    Please note that this dictation was created using voice recognition software. I have made every reasonable attempt to correct obvious errors, but I expect that there are errors of grammar and possibly content that I did not discover before finalizing the note.

## 2023-03-21 ENCOUNTER — HOSPITAL ENCOUNTER (OUTPATIENT)
Dept: LAB | Facility: MEDICAL CENTER | Age: 78
End: 2023-03-21
Attending: UROLOGY
Payer: MEDICARE

## 2023-03-21 LAB
ALBUMIN SERPL BCP-MCNC: 4.2 G/DL (ref 3.2–4.9)
ALBUMIN/GLOB SERPL: 1.4 G/DL
ALP SERPL-CCNC: 88 U/L (ref 30–99)
ALT SERPL-CCNC: 20 U/L (ref 2–50)
ANION GAP SERPL CALC-SCNC: 14 MMOL/L (ref 7–16)
AST SERPL-CCNC: 22 U/L (ref 12–45)
BILIRUB SERPL-MCNC: 1 MG/DL (ref 0.1–1.5)
BUN SERPL-MCNC: 15 MG/DL (ref 8–22)
CALCIUM ALBUM COR SERPL-MCNC: 9.5 MG/DL (ref 8.5–10.5)
CALCIUM SERPL-MCNC: 9.7 MG/DL (ref 8.5–10.5)
CHLORIDE SERPL-SCNC: 102 MMOL/L (ref 96–112)
CO2 SERPL-SCNC: 26 MMOL/L (ref 20–33)
CREAT SERPL-MCNC: 0.76 MG/DL (ref 0.5–1.4)
GFR SERPLBLD CREATININE-BSD FMLA CKD-EPI: 93 ML/MIN/1.73 M 2
GLOBULIN SER CALC-MCNC: 2.9 G/DL (ref 1.9–3.5)
GLUCOSE SERPL-MCNC: 106 MG/DL (ref 65–99)
POTASSIUM SERPL-SCNC: 4 MMOL/L (ref 3.6–5.5)
PROT SERPL-MCNC: 7.1 G/DL (ref 6–8.2)
SODIUM SERPL-SCNC: 142 MMOL/L (ref 135–145)
TESTOST SERPL-MCNC: <12 NG/DL (ref 175–781)

## 2023-03-21 PROCEDURE — 80053 COMPREHEN METABOLIC PANEL: CPT

## 2023-03-21 PROCEDURE — 36415 COLL VENOUS BLD VENIPUNCTURE: CPT

## 2023-03-21 PROCEDURE — 84403 ASSAY OF TOTAL TESTOSTERONE: CPT

## 2023-03-21 PROCEDURE — 84153 ASSAY OF PSA TOTAL: CPT

## 2023-03-22 LAB — PSA SERPL-MCNC: 9.93 NG/ML (ref 0–4)

## 2023-05-03 ENCOUNTER — HOSPITAL ENCOUNTER (OUTPATIENT)
Dept: LAB | Facility: MEDICAL CENTER | Age: 78
End: 2023-05-03
Attending: UROLOGY
Payer: MEDICARE

## 2023-05-03 LAB
ALBUMIN SERPL BCP-MCNC: 4.1 G/DL (ref 3.2–4.9)
ALBUMIN/GLOB SERPL: 1.6 G/DL
ALP SERPL-CCNC: 98 U/L (ref 30–99)
ALT SERPL-CCNC: 15 U/L (ref 2–50)
ANION GAP SERPL CALC-SCNC: 10 MMOL/L (ref 7–16)
AST SERPL-CCNC: 13 U/L (ref 12–45)
BILIRUB SERPL-MCNC: 1.2 MG/DL (ref 0.1–1.5)
BUN SERPL-MCNC: 16 MG/DL (ref 8–22)
CALCIUM ALBUM COR SERPL-MCNC: 9.3 MG/DL (ref 8.5–10.5)
CALCIUM SERPL-MCNC: 9.4 MG/DL (ref 8.5–10.5)
CHLORIDE SERPL-SCNC: 101 MMOL/L (ref 96–112)
CO2 SERPL-SCNC: 30 MMOL/L (ref 20–33)
CREAT SERPL-MCNC: 0.67 MG/DL (ref 0.5–1.4)
GFR SERPLBLD CREATININE-BSD FMLA CKD-EPI: 96 ML/MIN/1.73 M 2
GLOBULIN SER CALC-MCNC: 2.5 G/DL (ref 1.9–3.5)
GLUCOSE SERPL-MCNC: 104 MG/DL (ref 65–99)
POTASSIUM SERPL-SCNC: 3.5 MMOL/L (ref 3.6–5.5)
PROT SERPL-MCNC: 6.6 G/DL (ref 6–8.2)
PSA SERPL-MCNC: 6.14 NG/ML (ref 0–4)
SODIUM SERPL-SCNC: 141 MMOL/L (ref 135–145)
TESTOST SERPL-MCNC: <12 NG/DL (ref 175–781)

## 2023-05-03 PROCEDURE — 84403 ASSAY OF TOTAL TESTOSTERONE: CPT

## 2023-05-03 PROCEDURE — 80053 COMPREHEN METABOLIC PANEL: CPT

## 2023-05-03 PROCEDURE — 36415 COLL VENOUS BLD VENIPUNCTURE: CPT

## 2023-05-03 PROCEDURE — 84153 ASSAY OF PSA TOTAL: CPT

## 2023-06-07 ENCOUNTER — PATIENT MESSAGE (OUTPATIENT)
Dept: HEALTH INFORMATION MANAGEMENT | Facility: OTHER | Age: 78
End: 2023-06-07

## 2023-06-07 ENCOUNTER — DOCUMENTATION (OUTPATIENT)
Dept: HEALTH INFORMATION MANAGEMENT | Facility: OTHER | Age: 78
End: 2023-06-07
Payer: MEDICARE

## 2023-06-08 ENCOUNTER — HOSPITAL ENCOUNTER (OUTPATIENT)
Dept: LAB | Facility: MEDICAL CENTER | Age: 78
End: 2023-06-08
Attending: UROLOGY
Payer: MEDICARE

## 2023-06-08 LAB
ALBUMIN SERPL BCP-MCNC: 3.7 G/DL (ref 3.2–4.9)
ALBUMIN/GLOB SERPL: 1.5 G/DL
ALP SERPL-CCNC: 238 U/L (ref 30–99)
ALT SERPL-CCNC: 737 U/L (ref 2–50)
ANION GAP SERPL CALC-SCNC: 12 MMOL/L (ref 7–16)
AST SERPL-CCNC: 353 U/L (ref 12–45)
BILIRUB SERPL-MCNC: 1.6 MG/DL (ref 0.1–1.5)
BUN SERPL-MCNC: 20 MG/DL (ref 8–22)
CALCIUM ALBUM COR SERPL-MCNC: 9.5 MG/DL (ref 8.5–10.5)
CALCIUM SERPL-MCNC: 9.3 MG/DL (ref 8.5–10.5)
CHLORIDE SERPL-SCNC: 101 MMOL/L (ref 96–112)
CO2 SERPL-SCNC: 29 MMOL/L (ref 20–33)
CREAT SERPL-MCNC: 0.77 MG/DL (ref 0.5–1.4)
FASTING STATUS PATIENT QL REPORTED: NORMAL
GFR SERPLBLD CREATININE-BSD FMLA CKD-EPI: 92 ML/MIN/1.73 M 2
GLOBULIN SER CALC-MCNC: 2.4 G/DL (ref 1.9–3.5)
GLUCOSE SERPL-MCNC: 104 MG/DL (ref 65–99)
POTASSIUM SERPL-SCNC: 3.4 MMOL/L (ref 3.6–5.5)
PROT SERPL-MCNC: 6.1 G/DL (ref 6–8.2)
PSA SERPL-MCNC: 1.29 NG/ML (ref 0–4)
SODIUM SERPL-SCNC: 142 MMOL/L (ref 135–145)

## 2023-06-08 PROCEDURE — 84153 ASSAY OF PSA TOTAL: CPT

## 2023-06-08 PROCEDURE — 36415 COLL VENOUS BLD VENIPUNCTURE: CPT

## 2023-06-08 PROCEDURE — 80053 COMPREHEN METABOLIC PANEL: CPT

## 2023-07-13 ENCOUNTER — HOSPITAL ENCOUNTER (OUTPATIENT)
Dept: LAB | Facility: MEDICAL CENTER | Age: 78
End: 2023-07-13
Attending: UROLOGY
Payer: MEDICARE

## 2023-07-13 PROCEDURE — 84403 ASSAY OF TOTAL TESTOSTERONE: CPT

## 2023-07-13 PROCEDURE — 80053 COMPREHEN METABOLIC PANEL: CPT

## 2023-07-13 PROCEDURE — 84153 ASSAY OF PSA TOTAL: CPT

## 2023-07-13 PROCEDURE — 36415 COLL VENOUS BLD VENIPUNCTURE: CPT

## 2023-07-14 LAB
ALBUMIN SERPL BCP-MCNC: 3.9 G/DL (ref 3.2–4.9)
ALBUMIN/GLOB SERPL: 1.6 G/DL
ALP SERPL-CCNC: 100 U/L (ref 30–99)
ALT SERPL-CCNC: 20 U/L (ref 2–50)
ANION GAP SERPL CALC-SCNC: 14 MMOL/L (ref 7–16)
AST SERPL-CCNC: 24 U/L (ref 12–45)
BILIRUB SERPL-MCNC: 0.7 MG/DL (ref 0.1–1.5)
BUN SERPL-MCNC: 16 MG/DL (ref 8–22)
CALCIUM ALBUM COR SERPL-MCNC: 9.6 MG/DL (ref 8.5–10.5)
CALCIUM SERPL-MCNC: 9.5 MG/DL (ref 8.5–10.5)
CHLORIDE SERPL-SCNC: 105 MMOL/L (ref 96–112)
CO2 SERPL-SCNC: 25 MMOL/L (ref 20–33)
CREAT SERPL-MCNC: 0.62 MG/DL (ref 0.5–1.4)
GFR SERPLBLD CREATININE-BSD FMLA CKD-EPI: 98 ML/MIN/1.73 M 2
GLOBULIN SER CALC-MCNC: 2.5 G/DL (ref 1.9–3.5)
GLUCOSE SERPL-MCNC: 102 MG/DL (ref 65–99)
POTASSIUM SERPL-SCNC: 3.9 MMOL/L (ref 3.6–5.5)
PROT SERPL-MCNC: 6.4 G/DL (ref 6–8.2)
PSA SERPL-MCNC: 0.15 NG/ML (ref 0–4)
SODIUM SERPL-SCNC: 144 MMOL/L (ref 135–145)
TESTOST SERPL-MCNC: <12 NG/DL (ref 175–781)

## 2023-08-14 ENCOUNTER — HOSPITAL ENCOUNTER (OUTPATIENT)
Dept: LAB | Facility: MEDICAL CENTER | Age: 78
End: 2023-08-14
Attending: PHYSICIAN ASSISTANT
Payer: MEDICARE

## 2023-08-14 LAB
ALBUMIN SERPL BCP-MCNC: 4.1 G/DL (ref 3.2–4.9)
ALBUMIN/GLOB SERPL: 1.8 G/DL
ALP SERPL-CCNC: 89 U/L (ref 30–99)
ALT SERPL-CCNC: 21 U/L (ref 2–50)
ANION GAP SERPL CALC-SCNC: 11 MMOL/L (ref 7–16)
AST SERPL-CCNC: 21 U/L (ref 12–45)
BILIRUB SERPL-MCNC: 0.8 MG/DL (ref 0.1–1.5)
BUN SERPL-MCNC: 22 MG/DL (ref 8–22)
CALCIUM ALBUM COR SERPL-MCNC: 9.5 MG/DL (ref 8.5–10.5)
CALCIUM SERPL-MCNC: 9.6 MG/DL (ref 8.5–10.5)
CHLORIDE SERPL-SCNC: 105 MMOL/L (ref 96–112)
CO2 SERPL-SCNC: 28 MMOL/L (ref 20–33)
CREAT SERPL-MCNC: 0.59 MG/DL (ref 0.5–1.4)
GFR SERPLBLD CREATININE-BSD FMLA CKD-EPI: 100 ML/MIN/1.73 M 2
GLOBULIN SER CALC-MCNC: 2.3 G/DL (ref 1.9–3.5)
GLUCOSE SERPL-MCNC: 95 MG/DL (ref 65–99)
POTASSIUM SERPL-SCNC: 3.4 MMOL/L (ref 3.6–5.5)
PROT SERPL-MCNC: 6.4 G/DL (ref 6–8.2)
PSA SERPL-MCNC: 0.08 NG/ML (ref 0–4)
SODIUM SERPL-SCNC: 144 MMOL/L (ref 135–145)
TESTOST SERPL-MCNC: <2 NG/DL (ref 175–781)

## 2023-08-14 PROCEDURE — 84403 ASSAY OF TOTAL TESTOSTERONE: CPT

## 2023-08-14 PROCEDURE — 80053 COMPREHEN METABOLIC PANEL: CPT

## 2023-08-14 PROCEDURE — 36415 COLL VENOUS BLD VENIPUNCTURE: CPT

## 2023-08-14 PROCEDURE — 84153 ASSAY OF PSA TOTAL: CPT

## 2023-08-17 PROBLEM — I73.9 PERIPHERAL VASCULAR DISEASE, UNSPECIFIED (HCC): Status: ACTIVE | Noted: 2023-08-17

## 2023-09-15 ENCOUNTER — HOSPITAL ENCOUNTER (OUTPATIENT)
Dept: LAB | Facility: MEDICAL CENTER | Age: 78
End: 2023-09-15
Attending: PHYSICIAN ASSISTANT
Payer: MEDICARE

## 2023-09-15 LAB
ALBUMIN SERPL BCP-MCNC: 4 G/DL (ref 3.2–4.9)
ALBUMIN/GLOB SERPL: 1.7 G/DL
ALP SERPL-CCNC: 88 U/L (ref 30–99)
ALT SERPL-CCNC: 21 U/L (ref 2–50)
ANION GAP SERPL CALC-SCNC: 11 MMOL/L (ref 7–16)
AST SERPL-CCNC: 21 U/L (ref 12–45)
BILIRUB SERPL-MCNC: 0.6 MG/DL (ref 0.1–1.5)
BUN SERPL-MCNC: 16 MG/DL (ref 8–22)
CALCIUM ALBUM COR SERPL-MCNC: 8.9 MG/DL (ref 8.5–10.5)
CALCIUM SERPL-MCNC: 8.9 MG/DL (ref 8.5–10.5)
CHLORIDE SERPL-SCNC: 104 MMOL/L (ref 96–112)
CO2 SERPL-SCNC: 27 MMOL/L (ref 20–33)
CREAT SERPL-MCNC: 0.63 MG/DL (ref 0.5–1.4)
FASTING STATUS PATIENT QL REPORTED: NORMAL
GFR SERPLBLD CREATININE-BSD FMLA CKD-EPI: 98 ML/MIN/1.73 M 2
GLOBULIN SER CALC-MCNC: 2.4 G/DL (ref 1.9–3.5)
GLUCOSE SERPL-MCNC: 106 MG/DL (ref 65–99)
POTASSIUM SERPL-SCNC: 4 MMOL/L (ref 3.6–5.5)
PROT SERPL-MCNC: 6.4 G/DL (ref 6–8.2)
PSA SERPL-MCNC: 0.05 NG/ML (ref 0–4)
SODIUM SERPL-SCNC: 142 MMOL/L (ref 135–145)
TESTOST SERPL-MCNC: <2 NG/DL (ref 175–781)

## 2023-09-15 PROCEDURE — 80053 COMPREHEN METABOLIC PANEL: CPT

## 2023-09-15 PROCEDURE — 84153 ASSAY OF PSA TOTAL: CPT

## 2023-09-15 PROCEDURE — 84403 ASSAY OF TOTAL TESTOSTERONE: CPT

## 2023-09-15 PROCEDURE — 36415 COLL VENOUS BLD VENIPUNCTURE: CPT

## 2023-11-07 RX ORDER — ATORVASTATIN CALCIUM 20 MG/1
20 TABLET, FILM COATED ORAL NIGHTLY
Qty: 100 TABLET | Refills: 1 | Status: SHIPPED | OUTPATIENT
Start: 2023-11-07

## 2023-12-05 DIAGNOSIS — I10 ESSENTIAL HYPERTENSION: ICD-10-CM

## 2023-12-05 DIAGNOSIS — K21.9 GASTROESOPHAGEAL REFLUX DISEASE WITHOUT ESOPHAGITIS: ICD-10-CM

## 2023-12-05 DIAGNOSIS — N40.0 BENIGN PROSTATIC HYPERPLASIA WITHOUT LOWER URINARY TRACT SYMPTOMS: ICD-10-CM

## 2023-12-05 RX ORDER — TAMSULOSIN HYDROCHLORIDE 0.4 MG/1
0.4 CAPSULE ORAL
Qty: 100 CAPSULE | Refills: 0 | Status: SHIPPED | OUTPATIENT
Start: 2023-12-05 | End: 2024-03-12

## 2023-12-05 RX ORDER — METOPROLOL SUCCINATE 100 MG/1
100 TABLET, EXTENDED RELEASE ORAL 2 TIMES DAILY
Qty: 200 TABLET | Refills: 0 | Status: SHIPPED | OUTPATIENT
Start: 2023-12-05 | End: 2024-03-12

## 2023-12-05 RX ORDER — HYDROCHLOROTHIAZIDE 25 MG/1
25 TABLET ORAL
Qty: 100 TABLET | Refills: 0 | Status: SHIPPED | OUTPATIENT
Start: 2023-12-05 | End: 2024-03-12

## 2023-12-05 RX ORDER — OMEPRAZOLE 40 MG/1
40 CAPSULE, DELAYED RELEASE ORAL
Qty: 100 CAPSULE | Refills: 0 | Status: SHIPPED | OUTPATIENT
Start: 2023-12-05 | End: 2024-03-12

## 2023-12-13 ENCOUNTER — HOSPITAL ENCOUNTER (OUTPATIENT)
Dept: LAB | Facility: MEDICAL CENTER | Age: 78
End: 2023-12-13
Attending: PHYSICIAN ASSISTANT
Payer: MEDICARE

## 2023-12-13 PROCEDURE — 84403 ASSAY OF TOTAL TESTOSTERONE: CPT

## 2023-12-13 PROCEDURE — 36415 COLL VENOUS BLD VENIPUNCTURE: CPT

## 2023-12-13 PROCEDURE — 84153 ASSAY OF PSA TOTAL: CPT

## 2023-12-13 PROCEDURE — 80053 COMPREHEN METABOLIC PANEL: CPT

## 2023-12-14 DIAGNOSIS — E87.6 HYPOKALEMIA: ICD-10-CM

## 2023-12-14 LAB
ALBUMIN SERPL BCP-MCNC: 4.3 G/DL (ref 3.2–4.9)
ALBUMIN/GLOB SERPL: 2 G/DL
ALP SERPL-CCNC: 91 U/L (ref 30–99)
ALT SERPL-CCNC: 16 U/L (ref 2–50)
ANION GAP SERPL CALC-SCNC: 11 MMOL/L (ref 7–16)
AST SERPL-CCNC: 21 U/L (ref 12–45)
BILIRUB SERPL-MCNC: 0.8 MG/DL (ref 0.1–1.5)
BUN SERPL-MCNC: 19 MG/DL (ref 8–22)
CALCIUM ALBUM COR SERPL-MCNC: 9 MG/DL (ref 8.5–10.5)
CALCIUM SERPL-MCNC: 9.2 MG/DL (ref 8.5–10.5)
CHLORIDE SERPL-SCNC: 103 MMOL/L (ref 96–112)
CO2 SERPL-SCNC: 28 MMOL/L (ref 20–33)
CREAT SERPL-MCNC: 0.66 MG/DL (ref 0.5–1.4)
GFR SERPLBLD CREATININE-BSD FMLA CKD-EPI: 96 ML/MIN/1.73 M 2
GLOBULIN SER CALC-MCNC: 2.2 G/DL (ref 1.9–3.5)
GLUCOSE SERPL-MCNC: 122 MG/DL (ref 65–99)
POTASSIUM SERPL-SCNC: 3.7 MMOL/L (ref 3.6–5.5)
PROT SERPL-MCNC: 6.5 G/DL (ref 6–8.2)
PSA SERPL-MCNC: 0.03 NG/ML (ref 0–4)
SODIUM SERPL-SCNC: 142 MMOL/L (ref 135–145)
TESTOST SERPL-MCNC: <3 NG/DL (ref 175–781)

## 2023-12-14 RX ORDER — POTASSIUM CHLORIDE 1500 MG/1
20 TABLET, EXTENDED RELEASE ORAL 2 TIMES DAILY
Qty: 200 TABLET | Refills: 0 | Status: SHIPPED | OUTPATIENT
Start: 2023-12-14

## 2024-01-08 RX ORDER — MONTELUKAST SODIUM 10 MG/1
10 TABLET ORAL DAILY
Qty: 90 TABLET | Refills: 0 | Status: SHIPPED | OUTPATIENT
Start: 2024-01-08

## 2024-03-12 DIAGNOSIS — K21.9 GASTROESOPHAGEAL REFLUX DISEASE WITHOUT ESOPHAGITIS: ICD-10-CM

## 2024-03-12 DIAGNOSIS — I10 ESSENTIAL HYPERTENSION: ICD-10-CM

## 2024-03-12 DIAGNOSIS — N40.0 BENIGN PROSTATIC HYPERPLASIA WITHOUT LOWER URINARY TRACT SYMPTOMS: ICD-10-CM

## 2024-03-12 RX ORDER — TAMSULOSIN HYDROCHLORIDE 0.4 MG/1
0.4 CAPSULE ORAL
Qty: 90 CAPSULE | Refills: 0 | Status: SHIPPED | OUTPATIENT
Start: 2024-03-12

## 2024-03-12 RX ORDER — HYDROCHLOROTHIAZIDE 25 MG/1
25 TABLET ORAL
Qty: 100 TABLET | Refills: 0 | Status: SHIPPED | OUTPATIENT
Start: 2024-03-12

## 2024-03-12 RX ORDER — OMEPRAZOLE 40 MG/1
40 CAPSULE, DELAYED RELEASE ORAL
Qty: 90 CAPSULE | Refills: 0 | Status: SHIPPED | OUTPATIENT
Start: 2024-03-12

## 2024-03-12 RX ORDER — METOPROLOL SUCCINATE 100 MG/1
100 TABLET, EXTENDED RELEASE ORAL 2 TIMES DAILY
Qty: 200 TABLET | Refills: 0 | Status: SHIPPED | OUTPATIENT
Start: 2024-03-12

## 2024-03-12 NOTE — TELEPHONE ENCOUNTER
Received request via: Pharmacy    Was the patient seen in the last year in this department? No    Does the patient have an active prescription (recently filled or refills available) for medication(s) requested? No    [id number] - [place], [place] [address] AT [facility]     Does the patient have shelter Plus and need 100 day supply (blood pressure, diabetes and cholesterol meds only)? Yes, quantity updated to 100 days

## 2024-03-14 ENCOUNTER — HOSPITAL ENCOUNTER (OUTPATIENT)
Dept: LAB | Facility: MEDICAL CENTER | Age: 79
End: 2024-03-14
Attending: PHYSICIAN ASSISTANT
Payer: MEDICARE

## 2024-03-14 PROCEDURE — 36415 COLL VENOUS BLD VENIPUNCTURE: CPT

## 2024-03-14 PROCEDURE — 84403 ASSAY OF TOTAL TESTOSTERONE: CPT

## 2024-03-14 PROCEDURE — 80053 COMPREHEN METABOLIC PANEL: CPT

## 2024-03-14 PROCEDURE — 84153 ASSAY OF PSA TOTAL: CPT

## 2024-03-15 LAB
ALBUMIN SERPL BCP-MCNC: 4.1 G/DL (ref 3.2–4.9)
ALBUMIN/GLOB SERPL: 1.6 G/DL
ALP SERPL-CCNC: 86 U/L (ref 30–99)
ALT SERPL-CCNC: 19 U/L (ref 2–50)
ANION GAP SERPL CALC-SCNC: 12 MMOL/L (ref 7–16)
AST SERPL-CCNC: 21 U/L (ref 12–45)
BILIRUB SERPL-MCNC: 0.6 MG/DL (ref 0.1–1.5)
BUN SERPL-MCNC: 22 MG/DL (ref 8–22)
CALCIUM ALBUM COR SERPL-MCNC: 9.4 MG/DL (ref 8.5–10.5)
CALCIUM SERPL-MCNC: 9.5 MG/DL (ref 8.5–10.5)
CHLORIDE SERPL-SCNC: 103 MMOL/L (ref 96–112)
CO2 SERPL-SCNC: 27 MMOL/L (ref 20–33)
CREAT SERPL-MCNC: 0.7 MG/DL (ref 0.5–1.4)
GFR SERPLBLD CREATININE-BSD FMLA CKD-EPI: 95 ML/MIN/1.73 M 2
GLOBULIN SER CALC-MCNC: 2.6 G/DL (ref 1.9–3.5)
GLUCOSE SERPL-MCNC: 105 MG/DL (ref 65–99)
POTASSIUM SERPL-SCNC: 3.9 MMOL/L (ref 3.6–5.5)
PROT SERPL-MCNC: 6.7 G/DL (ref 6–8.2)
PSA SERPL-MCNC: 0.02 NG/ML (ref 0–4)
SODIUM SERPL-SCNC: 142 MMOL/L (ref 135–145)
TESTOST SERPL-MCNC: <3 NG/DL (ref 175–781)

## 2024-04-16 ASSESSMENT — PATIENT HEALTH QUESTIONNAIRE - PHQ9
1. LITTLE INTEREST OR PLEASURE IN DOING THINGS: NOT AT ALL
2. FEELING DOWN, DEPRESSED, IRRITABLE, OR HOPELESS: NOT AT ALL

## 2024-04-16 ASSESSMENT — ACTIVITIES OF DAILY LIVING (ADL): BATHING_REQUIRES_ASSISTANCE: 0

## 2024-04-16 ASSESSMENT — ENCOUNTER SYMPTOMS: GENERAL WELL-BEING: EXCELLENT

## 2024-04-18 NOTE — ASSESSMENT & PLAN NOTE
Chronic, stable. Currently taking atorvastatin 20 mg daily. Reports that he uses his stationary bicycle for 48 minutes every day. Denies chest pain and claudication.

## 2024-04-18 NOTE — ASSESSMENT & PLAN NOTE
Charli. Reports diagnosis in March 2020 with tumor removal in the bladder wall. Routinely monitored by urology, Dr. Gomez.

## 2024-04-18 NOTE — ASSESSMENT & PLAN NOTE
BMI is 32.04 kg/m2. Provided education on heart healthy diet with adequate intake of fruits, vegetables, and whole grains. Encourage 30 minutes of moderate exercise 3-4 times a week.

## 2024-04-18 NOTE — ASSESSMENT & PLAN NOTE
Chronic, stable. Currently taking omeprazole 40 mg daily. Reports avoiding dietary triggers. Denies early satiety, unintentional weight loss, belching, and persistent burning pain in chest or upper abdomen.

## 2024-04-18 NOTE — ASSESSMENT & PLAN NOTE
Chronic, stable. Reports diagnosis in August 2022 with radiation therapy. Currently taking orgovyx daily until September 2024. Routinely monitored by urology, Dr. Gomez.

## 2024-04-22 SDOH — ECONOMIC STABILITY: HOUSING INSECURITY
IN THE LAST 12 MONTHS, WAS THERE A TIME WHEN YOU DID NOT HAVE A STEADY PLACE TO SLEEP OR SLEPT IN A SHELTER (INCLUDING NOW)?: NO

## 2024-04-22 SDOH — ECONOMIC STABILITY: INCOME INSECURITY: IN THE LAST 12 MONTHS, WAS THERE A TIME WHEN YOU WERE NOT ABLE TO PAY THE MORTGAGE OR RENT ON TIME?: NO

## 2024-04-22 SDOH — ECONOMIC STABILITY: FOOD INSECURITY: WITHIN THE PAST 12 MONTHS, THE FOOD YOU BOUGHT JUST DIDN'T LAST AND YOU DIDN'T HAVE MONEY TO GET MORE.: NEVER TRUE

## 2024-04-22 SDOH — ECONOMIC STABILITY: HOUSING INSECURITY: IN THE LAST 12 MONTHS, HOW MANY PLACES HAVE YOU LIVED?: 1

## 2024-04-22 SDOH — HEALTH STABILITY: PHYSICAL HEALTH: ON AVERAGE, HOW MANY MINUTES DO YOU ENGAGE IN EXERCISE AT THIS LEVEL?: 50 MIN

## 2024-04-22 SDOH — ECONOMIC STABILITY: INCOME INSECURITY: HOW HARD IS IT FOR YOU TO PAY FOR THE VERY BASICS LIKE FOOD, HOUSING, MEDICAL CARE, AND HEATING?: NOT HARD AT ALL

## 2024-04-22 SDOH — ECONOMIC STABILITY: FOOD INSECURITY: WITHIN THE PAST 12 MONTHS, YOU WORRIED THAT YOUR FOOD WOULD RUN OUT BEFORE YOU GOT MONEY TO BUY MORE.: NEVER TRUE

## 2024-04-22 SDOH — HEALTH STABILITY: PHYSICAL HEALTH: ON AVERAGE, HOW MANY DAYS PER WEEK DO YOU ENGAGE IN MODERATE TO STRENUOUS EXERCISE (LIKE A BRISK WALK)?: 6 DAYS

## 2024-04-22 ASSESSMENT — SOCIAL DETERMINANTS OF HEALTH (SDOH)
HOW OFTEN DO YOU ATTENT MEETINGS OF THE CLUB OR ORGANIZATION YOU BELONG TO?: MORE THAN 4 TIMES PER YEAR
HOW MANY DRINKS CONTAINING ALCOHOL DO YOU HAVE ON A TYPICAL DAY WHEN YOU ARE DRINKING: 1 OR 2
WITHIN THE PAST 12 MONTHS, YOU WORRIED THAT YOUR FOOD WOULD RUN OUT BEFORE YOU GOT THE MONEY TO BUY MORE: NEVER TRUE
ARE YOU MARRIED, WIDOWED, DIVORCED, SEPARATED, NEVER MARRIED, OR LIVING WITH A PARTNER?: PATIENT DECLINED
HOW OFTEN DO YOU HAVE SIX OR MORE DRINKS ON ONE OCCASION: NEVER
HOW OFTEN DO YOU ATTENT MEETINGS OF THE CLUB OR ORGANIZATION YOU BELONG TO?: MORE THAN 4 TIMES PER YEAR
DO YOU BELONG TO ANY CLUBS OR ORGANIZATIONS SUCH AS CHURCH GROUPS UNIONS, FRATERNAL OR ATHLETIC GROUPS, OR SCHOOL GROUPS?: YES
HOW OFTEN DO YOU ATTEND CHURCH OR RELIGIOUS SERVICES?: PATIENT DECLINED
IN A TYPICAL WEEK, HOW MANY TIMES DO YOU TALK ON THE PHONE WITH FAMILY, FRIENDS, OR NEIGHBORS?: THREE TIMES A WEEK
HOW HARD IS IT FOR YOU TO PAY FOR THE VERY BASICS LIKE FOOD, HOUSING, MEDICAL CARE, AND HEATING?: NOT HARD AT ALL
HOW OFTEN DO YOU HAVE A DRINK CONTAINING ALCOHOL: 2-3 TIMES A WEEK
HOW OFTEN DO YOU GET TOGETHER WITH FRIENDS OR RELATIVES?: PATIENT DECLINED
ARE YOU MARRIED, WIDOWED, DIVORCED, SEPARATED, NEVER MARRIED, OR LIVING WITH A PARTNER?: PATIENT DECLINED
HOW OFTEN DO YOU ATTEND CHURCH OR RELIGIOUS SERVICES?: PATIENT DECLINED
HOW OFTEN DO YOU GET TOGETHER WITH FRIENDS OR RELATIVES?: PATIENT DECLINED
DO YOU BELONG TO ANY CLUBS OR ORGANIZATIONS SUCH AS CHURCH GROUPS UNIONS, FRATERNAL OR ATHLETIC GROUPS, OR SCHOOL GROUPS?: YES
IN A TYPICAL WEEK, HOW MANY TIMES DO YOU TALK ON THE PHONE WITH FAMILY, FRIENDS, OR NEIGHBORS?: THREE TIMES A WEEK

## 2024-04-22 ASSESSMENT — LIFESTYLE VARIABLES
AUDIT-C TOTAL SCORE: 3
HOW MANY STANDARD DRINKS CONTAINING ALCOHOL DO YOU HAVE ON A TYPICAL DAY: 1 OR 2
HOW OFTEN DO YOU HAVE SIX OR MORE DRINKS ON ONE OCCASION: NEVER
SKIP TO QUESTIONS 9-10: 1
HOW OFTEN DO YOU HAVE A DRINK CONTAINING ALCOHOL: 2-3 TIMES A WEEK

## 2024-04-22 ASSESSMENT — PATIENT HEALTH QUESTIONNAIRE - PHQ9: CLINICAL INTERPRETATION OF PHQ2 SCORE: 0

## 2024-04-23 ENCOUNTER — OFFICE VISIT (OUTPATIENT)
Dept: FAMILY PLANNING/WOMEN'S HEALTH CLINIC | Facility: PHYSICIAN GROUP | Age: 79
End: 2024-04-23
Attending: FAMILY MEDICINE
Payer: MEDICARE

## 2024-04-23 VITALS
HEIGHT: 70 IN | DIASTOLIC BLOOD PRESSURE: 62 MMHG | SYSTOLIC BLOOD PRESSURE: 132 MMHG | WEIGHT: 223.3 LBS | BODY MASS INDEX: 31.97 KG/M2

## 2024-04-23 DIAGNOSIS — Z85.51 HISTORY OF BLADDER CANCER: ICD-10-CM

## 2024-04-23 DIAGNOSIS — R35.1 BENIGN PROSTATIC HYPERPLASIA WITH NOCTURIA: ICD-10-CM

## 2024-04-23 DIAGNOSIS — E78.5 DYSLIPIDEMIA: ICD-10-CM

## 2024-04-23 DIAGNOSIS — C61 CARCINOMA OF PROSTATE (HCC): ICD-10-CM

## 2024-04-23 DIAGNOSIS — N40.1 BENIGN PROSTATIC HYPERPLASIA WITH NOCTURIA: ICD-10-CM

## 2024-04-23 DIAGNOSIS — K21.9 GASTROESOPHAGEAL REFLUX DISEASE WITHOUT ESOPHAGITIS: ICD-10-CM

## 2024-04-23 PROCEDURE — 3078F DIAST BP <80 MM HG: CPT

## 2024-04-23 PROCEDURE — 3075F SYST BP GE 130 - 139MM HG: CPT

## 2024-04-23 PROCEDURE — 1126F AMNT PAIN NOTED NONE PRSNT: CPT

## 2024-04-23 PROCEDURE — G0439 PPPS, SUBSEQ VISIT: HCPCS

## 2024-04-23 SDOH — ECONOMIC STABILITY: FOOD INSECURITY: WITHIN THE PAST 12 MONTHS, YOU WORRIED THAT YOUR FOOD WOULD RUN OUT BEFORE YOU GOT MONEY TO BUY MORE.: NEVER TRUE

## 2024-04-23 SDOH — ECONOMIC STABILITY: FOOD INSECURITY: WITHIN THE PAST 12 MONTHS, THE FOOD YOU BOUGHT JUST DIDN'T LAST AND YOU DIDN'T HAVE MONEY TO GET MORE.: NEVER TRUE

## 2024-04-23 SDOH — ECONOMIC STABILITY: INCOME INSECURITY: HOW HARD IS IT FOR YOU TO PAY FOR THE VERY BASICS LIKE FOOD, HOUSING, MEDICAL CARE, AND HEATING?: NOT HARD AT ALL

## 2024-04-23 ASSESSMENT — PAIN SCALES - GENERAL: PAINLEVEL: NO PAIN

## 2024-04-23 ASSESSMENT — FIBROSIS 4 INDEX: FIB4 SCORE: 1.16

## 2024-04-23 NOTE — PROGRESS NOTES
Comprehensive Health Assessment Program     Juma Stern is a 78 y.o. here for his comprehensive health assessment.    Patient Active Problem List    Diagnosis Date Noted    Peripheral vascular disease, unspecified (HCC) 08/17/2023    Murmur 03/02/2023    Carcinoma of prostate (HCC) 02/28/2023    Skin lesion 11/04/2022    BMI 32.0-32.9,adult 02/22/2022    Hypokalemia 10/14/2021    Hay fever 06/04/2021    Obstructive uropathy 06/04/2021    Adenomatous polyp of ascending colon 06/10/2020    Dyslipidemia 02/07/2018    History of bladder cancer 07/15/2016    Gastroesophageal reflux disease without esophagitis 01/15/2015    Benign prostatic hyperplasia with nocturia 01/15/2015       Current Outpatient Medications   Medication Sig Dispense Refill    omeprazole (PRILOSEC) 40 MG delayed-release capsule TAKE 1 CAPSULE BY MOUTH EVERY DAY 90 Capsule 0    hydroCHLOROthiazide 25 MG Tab TAKE 1 TABLET BY MOUTH EVERY  Tablet 0    metoprolol SR (TOPROL XL) 100 MG TABLET SR 24 HR TAKE 1 TABLET BY MOUTH TWICE DAILY 200 Tablet 0    tamsulosin (FLOMAX) 0.4 MG capsule TAKE 1 CAPSULE BY MOUTH EVERY MORNING BEFORE BREAKFAST 90 Capsule 0    montelukast (SINGULAIR) 10 MG Tab TAKE 1 TABLET BY MOUTH EVERY DAY 90 Tablet 0    KLOR-CON M20 20 MEQ Tab CR TAKE 1 TABLET BY MOUTH TWICE DAILY 200 Tablet 0    atorvastatin (LIPITOR) 20 MG Tab TAKE 1 TABLET BY MOUTH EVERY EVENING 100 Tablet 1    Multiple Vitamin (MULTIVITAMIN ADULT PO) Take 1 Tablet by mouth every morning.      niacin  MG Cap CR Take 500 mg by mouth at bedtime.      Coenzyme Q10 (CO Q10) 100 MG Cap Take 300 mg by mouth every morning.      Cholecalciferol (VITAMIN D) 2000 UNITS CAPS Take 2,000 Units by mouth every morning.       No current facility-administered medications for this visit.          Current supplements as per medication list.     Allergies:   Patient has no known allergies.  Social History     Tobacco Use    Smoking status: Former     Current  packs/day: 0.00     Average packs/day: 1 pack/day for 10.0 years (10.0 ttl pk-yrs)     Types: Cigarettes     Start date: 1976     Quit date: 1986     Years since quittin.3    Smokeless tobacco: Never    Tobacco comments:     Early 20's, then quit for yrs, started up again, finally quit for good.   Vaping Use    Vaping Use: Never used   Substance Use Topics    Alcohol use: Yes     Alcohol/week: 0.6 oz     Types: 1 Glasses of wine per week     Comment: wine every dinner    Drug use: Never     Family History   Problem Relation Age of Onset    Cancer Mother 57        breast cancer    No Known Problems Father      Juma  has a past medical history of Arthritis, BPH (benign prostatic hyperplasia), Cancer (HCC) (), Cataract (), GERD (gastroesophageal reflux disease), Glaucoma, Hay fever (2021), Heart burn, Hyperlipidemia, Hypertension, Indigestion, Obstructive uropathy (2021), and Urinary bladder disorder ().   Past Surgical History:   Procedure Laterality Date    NC CYSTOURETHROSCOPY,BIOPSIES  2023    Procedure: CYSTOSCOPY, WITH BLADDER BIOPSY AND INSTILLATION OF GEMCITIBINE;  Surgeon: Colin Gmoez M.D.;  Location: [facility];  Service: Urology    TURBT (TRANSURETHRAL RESECTION OF BLADDER TUMOR)  2023    Procedure: MONOPOLAR TRANSURETHRAL RESECTION OF BLADDER TUMOR;  Surgeon: Colin Gomez M.D.;  Location: [facility];  Service: Urology    CYSTOSCOPY  07/15/2016    Procedure: CYSTOSCOPY;  Surgeon: Lew Moran M.D.;  Location: [facility];  Service:     TRANS URETHRAL RESECTION BLADDER  07/15/2016    Procedure: TRANS URETHRAL RESECTION BLADDER Tumor;  Surgeon: Lew Moran M.D.;  Location: [facility];  Service:     ELBA BY LAPAROSCOPY  2015    Procedure: ELBA BY LAPAROSCOPY;  Surgeon: Randall Ram M.D.;  Location: [facility];  Service:     UMBILICAL HERNIA REPAIR  2015    Procedure:  UMBILICAL HERNIA REPAIR;  Surgeon: Randall Ram M.D.;  [facility];  Service:     LEBA BY LAPAROSCOPY  01/01/2015    APPENDECTOMY      at age 6    OTHER  2018    hernia    OTHER ABDOMINAL SURGERY  1946    pyloric stenosis repair as an infant    OTHER CARDIAC SURGERY  2021       Screening:  In the last six months have you experienced any leakage of urine? No    Depression Screening  Little interest or pleasure in doing things?  0 - not at all  Feeling down, depressed , or hopeless? 0 - not at all  Patient Health Questionnaire Score: 0     If depressive symptoms identified deferred to follow up visit unless specifically addressed in assessment and plan.    Interpretation of PHQ-9 Total Score   Score Severity   1-4 No Depression   5-9 Mild Depression   10-14 Moderate Depression   15-19 Moderately Severe Depression   20-27 Severe Depression    Screening for Cognitive Impairment  Do you or any of your friends or family members have any concern about your memory? No  Three Minute Recall (Leader, Season, Table) 3/3    Raj clock face with all 12 numbers and set the hands to show 10 minutes after 11.  Yes    Cognitive concerns identified deferred for follow up unless specifically addressed in assessment and plan.    Fall Risk Assessment  Has the patient had two or more falls in the last year or any fall with injury in the last year?  No    Safety Assessment  Do you always wear your seatbelt?  Yes  Any changes to home needed to function safely? No  Difficulty hearing.  No  Patient counseled about all safety risks that were identified.    Functional Assessment ADLs  Are there any barriers preventing you from cooking for yourself or meeting nutritional needs?  No.    Are there any barriers preventing you from driving safely or obtaining transportation?  No.    Are there any barriers preventing you from using a telephone or calling for help?  No    Are there any barriers preventing you from shopping?   No.    Are there any barriers preventing you from taking care of your own finances?  No    Are there any barriers preventing you from managing your medications?  No    Are there any barriers preventing you from showering, bathing or dressing yourself? No    Are there any barriers preventing you from doing housework or laundry? No  Are there any barriers preventing you from using the toilet?No  Are you currently engaging in any exercise or physical activity?  Yes.      Self-Assessment of Health  What is your perception of your health? Excellent  Do you sleep more than six hours a night? Yes  In the past 7 days, how much did pain keep you from doing your normal work? None  Do you spend quality time with family or friends (virtually or in person)? Yes  Do you usually eat a heart healthy diet that constists of a variety of fruits, vegetables, whole grains and fiber? Yes  Do you eat foods high in fat and/or Fast Food more than three times per week? No    Advance Care Planning  Do you have an Advance Directive, Living Will, Durable Power of , or POLST? Yes                 Health Maintenance Summary            Overdue - Zoster (Shingles) Vaccines (1 of 2) Never done      No completion history exists for this topic.              Overdue - IMM DTaP/Tdap/Td Vaccine (2 - Td or Tdap) Overdue since 5/23/2022 05/23/2012  Imm Admin: Tdap Vaccine              Overdue - COVID-19 Vaccine (3 - 2023-24 season) Overdue since 9/1/2023      04/15/2021  Imm Admin: PFIZER PURPLE CAP SARS-COV-2 VACCINATION (12+)    03/23/2021  Imm Admin: PFIZER PURPLE CAP SARS-COV-2 VACCINATION (12+)              Influenza Vaccine (Season Ended) Next due on 9/1/2024 01/14/2016  Imm Admin: Influenza Vaccine Quad Inj (Preserved)              Annual Wellness Visit (Yearly) Next due on 4/23/2025 04/23/2024  Done - Comprehensive Health Assessment    04/23/2024  Level of Service: ANNUAL WELLNESS VISIT-INCLUDES PPPS SUBSEQUE*    08/17/2023   Level of Service: ANNUAL WELLNESS VISIT-INCLUDES PPPS SUBSEQUE*    02/22/2022  Level of Service: NM ANNUAL WELLNESS VISIT-INCLUDES PPPS SUBSEQUE*    06/04/2021  Done    Only the first 5 history entries have been loaded, but more history exists.              Pneumococcal Vaccine: 65+ Years (Series Information) Completed      01/06/2017  Imm Admin: Pneumococcal polysaccharide vaccine (PPSV-23)    01/14/2016  Imm Admin: Pneumococcal Conjugate Vaccine (Prevnar/PCV-13)              Hepatitis C Screening  Completed      06/15/2020  Hepatitis C Antibody component of HEP C VIRUS ANTIBODY              Hepatitis A Vaccine (Hep A) (Series Information) Aged Out      No completion history exists for this topic.              HPV Vaccines (Series Information) Aged Out      No completion history exists for this topic.              Polio Vaccine (Inactivated Polio) (Series Information) Aged Out      No completion history exists for this topic.              Meningococcal Immunization (Series Information) Aged Out      No completion history exists for this topic.              Discontinued - Colorectal Cancer Screening  Discontinued        Frequency changed to Never automatically (Topic No Longer Applies)    03/19/2021  REFERRAL TO GI FOR COLONOSCOPY    11/03/2017  REFERRAL TO GI FOR COLONOSCOPY    12/03/2013  AMB REFERRAL TO GI FOR COLONOSCOPY              Discontinued - Hepatitis B Vaccine (Hep B)  Discontinued      No completion history exists for this topic.              Discontinued - Abdominal Aortic Aneurysm (AAA) Screening  Discontinued        Frequency changed to Never automatically (Topic No Longer Applies)    02/04/2005  CT-ABDOMEN-PELVIS WITH                    Patient Care Team:  Yefri Iqbal III, M.D. as PCP - General (Family Medicine)  [facility]  [facility] (Inactive)  [facility] as        Financial Resource Strain: Low Risk   "(4/23/2024)    Overall Financial Resource Strain (CARDIA)     Difficulty of Paying Living Expenses: Not hard at all      Transportation Needs: No Transportation Needs (4/23/2024)    PRAPARE - Transportation     Lack of Transportation (Medical): No     Lack of Transportation (Non-Medical): No      Food Insecurity: No Food Insecurity (4/23/2024)    Hunger Vital Sign     Worried About Running Out of Food in the Last Year: Never true     Ran Out of Food in the Last Year: Never true        Encounter Vitals  Blood Pressure : 132/62  Weight: 101 kg (223 lb 4.8 oz)  Height: 177.8 cm (5' 10\")  BMI (Calculated): 32.04  Pain Score: No pain     Alert, oriented in no acute distress.  Eye contact is good, speech goal directed, affect calm.    Assessment and Plan. The following treatment and monitoring plan is recommended:    Benign prostatic hyperplasia with nocturia  Chronic, stable. Currently taking tamsulosin 0.4 mg daily. Denies obstructive symptoms, dysuria, and flank pain.     BMI 32.0-32.9,adult  BMI is 32.04 kg/m2. Provided education on heart healthy diet with adequate intake of fruits, vegetables, and whole grains. Encourage 30 minutes of moderate exercise 3-4 times a week.    Carcinoma of prostate (HCC)  Chronic, stable. Reports diagnosis in August 2022 with radiation therapy. Currently taking orgovyx daily until September 2024. Routinely monitored by urology, Dr. Gomez.    Dyslipidemia  Chronic, stable. Currently taking atorvastatin 20 mg daily. Reports that he uses his stationary bicycle for 48 minutes every day. Denies chest pain and claudication.    Gastroesophageal reflux disease without esophagitis  Chronic, stable. Currently taking omeprazole 40 mg daily. Reports avoiding dietary triggers. Denies early satiety, unintentional weight loss, belching, and persistent burning pain in chest or upper abdomen.    History of bladder cancer  Stable. Reports diagnosis in March 2020 with tumor removal in the bladder wall. " Routinely monitored by urology, Dr. Gomez.      Services suggested: No services needed at this time  Health Care Screening: Age-appropriate preventive services recommended by USPTF and ACIP covered by Medicare were discussed today. Services ordered if indicated and agreed upon by the patient.  Referrals offered: Community-based lifestyle interventions to reduce health risks and promote self-management and wellness, fall prevention, nutrition, physical activity, tobacco-use cessation, weight loss, and mental health services as per orders if indicated.    Discussion today about general wellness and lifestyle habits:    Prevent falls and reduce trip hazards; Cautioned about securing or removing rugs.  Have a working fire alarm and carbon monoxide detector.  Engage in regular physical activity and social activities.    Follow-up: Return for appointment with Primary Care Provider as needed.

## 2024-04-25 ENCOUNTER — OFFICE VISIT (OUTPATIENT)
Dept: MEDICAL GROUP | Facility: PHYSICIAN GROUP | Age: 79
End: 2024-04-25
Payer: MEDICARE

## 2024-04-25 VITALS
OXYGEN SATURATION: 98 % | HEIGHT: 70 IN | BODY MASS INDEX: 31.92 KG/M2 | SYSTOLIC BLOOD PRESSURE: 126 MMHG | DIASTOLIC BLOOD PRESSURE: 72 MMHG | RESPIRATION RATE: 16 BRPM | HEART RATE: 88 BPM | TEMPERATURE: 97.9 F | WEIGHT: 223 LBS

## 2024-04-25 DIAGNOSIS — N40.0 BENIGN PROSTATIC HYPERPLASIA WITHOUT LOWER URINARY TRACT SYMPTOMS: ICD-10-CM

## 2024-04-25 DIAGNOSIS — Z00.00 ADULT GENERAL MEDICAL EXAM: ICD-10-CM

## 2024-04-25 DIAGNOSIS — I10 ESSENTIAL HYPERTENSION: ICD-10-CM

## 2024-04-25 DIAGNOSIS — K21.9 GASTROESOPHAGEAL REFLUX DISEASE WITHOUT ESOPHAGITIS: ICD-10-CM

## 2024-04-25 DIAGNOSIS — J34.89 NASAL DRAINAGE: ICD-10-CM

## 2024-04-25 DIAGNOSIS — C61 CARCINOMA OF PROSTATE (HCC): ICD-10-CM

## 2024-04-25 DIAGNOSIS — E87.6 HYPOKALEMIA: ICD-10-CM

## 2024-04-25 DIAGNOSIS — R01.1 MURMUR: ICD-10-CM

## 2024-04-25 PROBLEM — I73.9 PERIPHERAL VASCULAR DISEASE, UNSPECIFIED (HCC): Status: RESOLVED | Noted: 2023-08-17 | Resolved: 2024-04-25

## 2024-04-25 PROBLEM — M85.80 OSTEOPENIA: Status: ACTIVE | Noted: 2023-09-18

## 2024-04-25 PROCEDURE — 3074F SYST BP LT 130 MM HG: CPT | Performed by: FAMILY MEDICINE

## 2024-04-25 PROCEDURE — 3078F DIAST BP <80 MM HG: CPT | Performed by: FAMILY MEDICINE

## 2024-04-25 PROCEDURE — 99214 OFFICE O/P EST MOD 30 MIN: CPT | Performed by: FAMILY MEDICINE

## 2024-04-25 RX ORDER — TAMSULOSIN HYDROCHLORIDE 0.4 MG/1
0.4 CAPSULE ORAL
Qty: 90 CAPSULE | Refills: 3 | Status: SHIPPED | OUTPATIENT
Start: 2024-04-25

## 2024-04-25 RX ORDER — AZELASTINE 1 MG/ML
1 SPRAY, METERED NASAL 2 TIMES DAILY
Qty: 30 ML | Refills: 3 | Status: SHIPPED | OUTPATIENT
Start: 2024-04-25

## 2024-04-25 RX ORDER — HYDROCHLOROTHIAZIDE 25 MG/1
25 TABLET ORAL
Qty: 100 TABLET | Refills: 3 | Status: SHIPPED | OUTPATIENT
Start: 2024-04-25

## 2024-04-25 RX ORDER — POTASSIUM CHLORIDE 20 MEQ/1
20 TABLET, EXTENDED RELEASE ORAL 2 TIMES DAILY
Qty: 200 TABLET | Refills: 3 | Status: SHIPPED | OUTPATIENT
Start: 2024-04-25

## 2024-04-25 RX ORDER — ATORVASTATIN CALCIUM 20 MG/1
20 TABLET, FILM COATED ORAL NIGHTLY
Qty: 100 TABLET | Refills: 3 | Status: SHIPPED | OUTPATIENT
Start: 2024-04-25

## 2024-04-25 RX ORDER — LANSOPRAZOLE 30 MG/1
30 CAPSULE, DELAYED RELEASE ORAL DAILY
Qty: 90 CAPSULE | Refills: 3 | Status: SHIPPED | OUTPATIENT
Start: 2024-04-25

## 2024-04-25 RX ORDER — RELUGOLIX 120 MG/1
TABLET, FILM COATED ORAL
COMMUNITY
Start: 2024-04-08

## 2024-04-25 RX ORDER — ABIRATERONE ACETATE 250 MG/1
TABLET ORAL
COMMUNITY

## 2024-04-25 RX ORDER — METOPROLOL SUCCINATE 100 MG/1
100 TABLET, EXTENDED RELEASE ORAL 2 TIMES DAILY
Qty: 200 TABLET | Refills: 3 | Status: SHIPPED | OUTPATIENT
Start: 2024-04-25

## 2024-04-25 ASSESSMENT — FIBROSIS 4 INDEX: FIB4 SCORE: 1.16

## 2024-04-25 NOTE — ASSESSMENT & PLAN NOTE
This is a chronic problem.  He states the omeprazole is working as well as it used to.  He like to try different medication.  Will change him to Prevacid.

## 2024-04-25 NOTE — ASSESSMENT & PLAN NOTE
This is a chronic problem.  He states that the Singulair does not seem to be working very well anymore.  He is going to stop that.  We talked about other treatment options including the nasal sprays.  In the past has tried Flonase without success so we will try him on Astelin nasal spray.

## 2024-04-25 NOTE — PROGRESS NOTES
Subjective:     CC: Here for exam and to discuss his various medical issues.  Also needs medication refills.    HPI:   Juma presents today with the following medical concerns:    Adult general medical exam  Patient is here for his physical exam.  Overall he states he is doing very well.  He is being followed for his various cancers and states he is doing very well with that treatment.  No particular concerns on today's visit.    Carcinoma of prostate (HCC)  This is a chronic problem.  Under the care of urology.    Gastroesophageal reflux disease without esophagitis  This is a chronic problem.  He states the omeprazole is working as well as it used to.  He like to try different medication.  Will change him to Prevacid.    Hypokalemia  This is a chronic problem.  He is needing medication refill.    Murmur  This is a chronic stable condition.  Remains very soft.  No current symptoms.    Nasal drainage  This is a chronic problem.  He states that the Singulair does not seem to be working very well anymore.  He is going to stop that.  We talked about other treatment options including the nasal sprays.  In the past has tried Flonase without success so we will try him on Astelin nasal spray.    BMI 32.0-32.9,adult  This is a chronic condition.  He continues to work on weight management.    Past Medical History:   Diagnosis Date    Arthritis     lower back    BPH (benign prostatic hyperplasia)     Cancer (HCC) 2017    [place] Tumor Removed    Cataract 2020    GERD (gastroesophageal reflux disease)     Glaucoma     in right eye, no drops    Hay fever 06/04/2021    Heart burn     Hyperlipidemia     Hypertension     well controlled on meds    Indigestion     pt on prilosec    Obstructive uropathy 06/04/2021    Urinary bladder disorder 2017    Tumor Removed       Social History     Tobacco Use    Smoking status: Former     Current packs/day: 0.00     Average packs/day: 1 pack/day for 10.0 years (10.0 ttl pk-yrs)     Types:  Cigarettes     Start date: 1976     Quit date: 1986     Years since quittin.3    Smokeless tobacco: Never    Tobacco comments:     Early 20's, then quit for yrs, started up again, finally quit for good.   Vaping Use    Vaping Use: Never used   Substance Use Topics    Alcohol use: Yes     Alcohol/week: 0.6 oz     Types: 1 Glasses of wine per week     Comment: wine every dinner    Drug use: Never       Current Outpatient Medications Ordered in Epic   Medication Sig Dispense Refill    ORGOVYX 120 MG Tab       lansoprazole (PREVACID) 30 MG CAPSULE DELAYED RELEASE Take 1 Capsule by mouth every day. 90 Capsule 3    atorvastatin (LIPITOR) 20 MG Tab Take 1 Tablet by mouth every evening. 100 Tablet 3    hydroCHLOROthiazide 25 MG Tab Take 1 Tablet by mouth every day. 100 Tablet 3    potassium chloride SA (KLOR-CON M20) 20 MEQ Tab CR Take 1 Tablet by mouth 2 times a day. 200 Tablet 3    metoprolol SR (TOPROL XL) 100 MG TABLET SR 24 HR Take 1 Tablet by mouth 2 times a day. 200 Tablet 3    azelastine (ASTELIN) 137 MCG/SPRAY nasal spray Administer 1 Spray into affected nostril(S) 2 times a day. 30 mL 3    tamsulosin (FLOMAX) 0.4 MG capsule Take 1 Capsule by mouth every morning before breakfast. 90 Capsule 3    Multiple Vitamin (MULTIVITAMIN ADULT PO) Take 1 Tablet by mouth every morning.      niacin  MG Cap CR Take 500 mg by mouth at bedtime.      Coenzyme Q10 (CO Q10) 100 MG Cap Take 300 mg by mouth every morning.      Cholecalciferol (VITAMIN D) 2000 UNITS CAPS Take 2,000 Units by mouth every morning.      Abiraterone Acetate 250 MG Tab abiraterone 250 mg tablet   TAKE 4 TABLETS BY MOUTH EVERY DAY       No current [facility]-ordered facility-administered medications on file.       Allergies:  Patient has no known allergies.    Health Maintenance: Completed    ROS:  Gen: no fevers/chills, no changes in weight  Eyes: no changes in vision  ENT: no sore throat, no hearing loss, no bloody nose  Pulm: no sob, no  "cough  CV: no chest pain, no palpitations  GI: no nausea/vomiting, no diarrhea  : no dysuria  MSk: no myalgias  Skin: no rash  Neuro: no headaches, no numbness/tingling  Heme/Lymph: no easy bruising      Objective:       Exam:  /72 (BP Location: Left arm, Patient Position: Sitting, BP Cuff Size: Adult)   Pulse 88   Temp 36.6 °C (97.9 °F) (Temporal)   Resp 16   Ht 1.778 m (5' 10\")   Wt 101 kg (223 lb)   SpO2 98%   BMI 32.00 kg/m²  Body mass index is 32 kg/m².    Gen: Alert and oriented, No apparent distress.  Eyes:   Extraocular motions intact.  No scleral icterus seen.  Ears:    Ear canals and TMs are clear.  Neck: Neck is supple without lymphadenopathy.  Thyroid exam is normal.  Lungs: Normal effort, CTA bilaterally, no wheezes, rhonchi, or rales  CV: Regular rate and rhythm. No  rubs, or gallops.  Soft 1/6 to 2/6 systolic murmur loudest in the aortic area unchanged.  No carotid bruits.  Abdomen: Soft, nontender, no organomegaly or masses.  Ext: No clubbing, cyanosis, edema.  Neuro: Cranial nerves II through VIII are grossly intact.  No lateralizing signs are seen.    Labs: Ordered and reviewed    Assessment & Plan:     78 y.o. male with the following -     1. Essential hypertension  This is a chronic stable condition.  Medications renewed.  Labs ordered.  - hydroCHLOROthiazide 25 MG Tab; Take 1 Tablet by mouth every day.  Dispense: 100 Tablet; Refill: 3  - metoprolol SR (TOPROL XL) 100 MG TABLET SR 24 HR; Take 1 Tablet by mouth 2 times a day.  Dispense: 200 Tablet; Refill: 3  - Lipid Profile; Future  - CBC WITH DIFFERENTIAL; Future    2. Hypokalemia  This is a chronic problem.  Medication renewed.  - potassium chloride SA (KLOR-CON M20) 20 MEQ Tab CR; Take 1 Tablet by mouth 2 times a day.  Dispense: 200 Tablet; Refill: 3    3. Benign prostatic hyperplasia without lower urinary tract symptoms  This is a chronic problem.  Continue to follow-up with urology.  - tamsulosin (FLOMAX) 0.4 MG capsule; Take " 1 Capsule by mouth every morning before breakfast.  Dispense: 90 Capsule; Refill: 3    4. Murmur  This is a chronic stable condition.  If it ever changes we will get an echocardiogram.    5. Carcinoma of prostate (HCC)  This is a chronic problem.  Continue care through urology.    6. Nasal drainage  This is a chronic problem.  Will try him on Astelin nasal spray and see how he does.  I also told him about trying oil of oregano capsules.    7. Adult general medical exam  Patient's medical exam today was unchanged.  Continue to follow.  General healthcare issues addressed.  Previous labs ordered by other physicians reviewed.    8. Gastroesophageal reflux disease without esophagitis  This is a chronic problem.  Patient be changed to Prevacid.  He will let me know how he does on that.  If it does not quite clear his symptoms we could add on Tagamet at bedtime.    9. BMI 32.0-32.9,adult  This is a chronic problem.  Continue to work on weight reduction.  - Patient identified as having weight management issue.  Appropriate orders and counseling given.    [facility] Form    Last edited 04/25/24 11:18 PDT by Yefri Iqbal III, M.D.         Return in about 6 months (around 10/25/2024) for Long.    Please note that this dictation was created using voice recognition software. I have made every reasonable attempt to correct obvious errors, but I expect that there are errors of grammar and possibly content that I did not discover before finalizing the note.

## 2024-04-25 NOTE — ASSESSMENT & PLAN NOTE
Patient is here for his physical exam.  Overall he states he is doing very well.  He is being followed for his various cancers and states he is doing very well with that treatment.  No particular concerns on today's visit.

## 2024-04-29 ENCOUNTER — HOSPITAL ENCOUNTER (OUTPATIENT)
Dept: LAB | Facility: MEDICAL CENTER | Age: 79
End: 2024-04-29
Attending: RADIOLOGY
Payer: MEDICARE

## 2024-04-29 LAB — PSA SERPL-MCNC: 0.02 NG/ML (ref 0–4)

## 2024-04-29 PROCEDURE — 84153 ASSAY OF PSA TOTAL: CPT

## 2024-04-29 PROCEDURE — 36415 COLL VENOUS BLD VENIPUNCTURE: CPT

## 2024-06-21 ENCOUNTER — HOSPITAL ENCOUNTER (OUTPATIENT)
Dept: LAB | Facility: MEDICAL CENTER | Age: 79
End: 2024-06-21
Attending: PHYSICIAN ASSISTANT
Payer: MEDICARE

## 2024-06-21 ENCOUNTER — HOSPITAL ENCOUNTER (OUTPATIENT)
Dept: LAB | Facility: MEDICAL CENTER | Age: 79
End: 2024-06-21
Attending: FAMILY MEDICINE
Payer: MEDICARE

## 2024-06-21 DIAGNOSIS — I10 ESSENTIAL HYPERTENSION: ICD-10-CM

## 2024-06-21 LAB
ALBUMIN SERPL BCP-MCNC: 3.8 G/DL (ref 3.2–4.9)
ALBUMIN/GLOB SERPL: 1.5 G/DL
ALP SERPL-CCNC: 96 U/L (ref 30–99)
ALT SERPL-CCNC: 22 U/L (ref 2–50)
ANION GAP SERPL CALC-SCNC: 13 MMOL/L (ref 7–16)
AST SERPL-CCNC: 23 U/L (ref 12–45)
BASOPHILS # BLD AUTO: 0.8 % (ref 0–1.8)
BASOPHILS # BLD: 0.04 K/UL (ref 0–0.12)
BILIRUB SERPL-MCNC: 0.8 MG/DL (ref 0.1–1.5)
BUN SERPL-MCNC: 17 MG/DL (ref 8–22)
CALCIUM ALBUM COR SERPL-MCNC: 9.6 MG/DL (ref 8.5–10.5)
CALCIUM SERPL-MCNC: 9.4 MG/DL (ref 8.5–10.5)
CHLORIDE SERPL-SCNC: 104 MMOL/L (ref 96–112)
CHOLEST SERPL-MCNC: 166 MG/DL (ref 100–199)
CO2 SERPL-SCNC: 25 MMOL/L (ref 20–33)
CREAT SERPL-MCNC: 0.65 MG/DL (ref 0.5–1.4)
EOSINOPHIL # BLD AUTO: 0.14 K/UL (ref 0–0.51)
EOSINOPHIL NFR BLD: 2.6 % (ref 0–6.9)
ERYTHROCYTE [DISTWIDTH] IN BLOOD BY AUTOMATED COUNT: 45.5 FL (ref 35.9–50)
FASTING STATUS PATIENT QL REPORTED: NORMAL
GFR SERPLBLD CREATININE-BSD FMLA CKD-EPI: 97 ML/MIN/1.73 M 2
GLOBULIN SER CALC-MCNC: 2.6 G/DL (ref 1.9–3.5)
GLUCOSE SERPL-MCNC: 109 MG/DL (ref 65–99)
HCT VFR BLD AUTO: 37 % (ref 42–52)
HDLC SERPL-MCNC: 38 MG/DL
HGB BLD-MCNC: 12.5 G/DL (ref 14–18)
IMM GRANULOCYTES # BLD AUTO: 0.02 K/UL (ref 0–0.11)
IMM GRANULOCYTES NFR BLD AUTO: 0.4 % (ref 0–0.9)
LDLC SERPL CALC-MCNC: 91 MG/DL
LYMPHOCYTES # BLD AUTO: 0.97 K/UL (ref 1–4.8)
LYMPHOCYTES NFR BLD: 18.2 % (ref 22–41)
MCH RBC QN AUTO: 34 PG (ref 27–33)
MCHC RBC AUTO-ENTMCNC: 33.8 G/DL (ref 32.3–36.5)
MCV RBC AUTO: 100.5 FL (ref 81.4–97.8)
MONOCYTES # BLD AUTO: 0.76 K/UL (ref 0–0.85)
MONOCYTES NFR BLD AUTO: 14.3 % (ref 0–13.4)
NEUTROPHILS # BLD AUTO: 3.39 K/UL (ref 1.82–7.42)
NEUTROPHILS NFR BLD: 63.7 % (ref 44–72)
NRBC # BLD AUTO: 0 K/UL
NRBC BLD-RTO: 0 /100 WBC (ref 0–0.2)
PLATELET # BLD AUTO: 235 K/UL (ref 164–446)
PMV BLD AUTO: 9.5 FL (ref 9–12.9)
POTASSIUM SERPL-SCNC: 3.9 MMOL/L (ref 3.6–5.5)
PROT SERPL-MCNC: 6.4 G/DL (ref 6–8.2)
PSA SERPL-MCNC: <0.02 NG/ML (ref 0–4)
RBC # BLD AUTO: 3.68 M/UL (ref 4.7–6.1)
SODIUM SERPL-SCNC: 142 MMOL/L (ref 135–145)
TESTOST SERPL-MCNC: <3 NG/DL (ref 175–781)
TRIGL SERPL-MCNC: 187 MG/DL (ref 0–149)
WBC # BLD AUTO: 5.3 K/UL (ref 4.8–10.8)

## 2024-06-21 PROCEDURE — 80053 COMPREHEN METABOLIC PANEL: CPT

## 2024-06-21 PROCEDURE — 84403 ASSAY OF TOTAL TESTOSTERONE: CPT

## 2024-06-21 PROCEDURE — 85025 COMPLETE CBC W/AUTO DIFF WBC: CPT

## 2024-06-21 PROCEDURE — 84153 ASSAY OF PSA TOTAL: CPT

## 2024-06-21 PROCEDURE — 80061 LIPID PANEL: CPT

## 2024-06-25 ENCOUNTER — PATIENT MESSAGE (OUTPATIENT)
Dept: MEDICAL GROUP | Facility: PHYSICIAN GROUP | Age: 79
End: 2024-06-25
Payer: MEDICARE

## 2024-06-25 DIAGNOSIS — I10 ESSENTIAL HYPERTENSION: ICD-10-CM

## 2024-06-25 RX ORDER — HYDROCHLOROTHIAZIDE 25 MG/1
25 TABLET ORAL
Qty: 100 TABLET | Refills: 0 | Status: SHIPPED | OUTPATIENT
Start: 2024-06-25

## 2024-06-25 RX ORDER — METOPROLOL SUCCINATE 100 MG/1
100 TABLET, EXTENDED RELEASE ORAL 2 TIMES DAILY
Qty: 200 TABLET | Refills: 0 | Status: SHIPPED | OUTPATIENT
Start: 2024-06-25

## 2024-06-26 DIAGNOSIS — D64.89 ANEMIA DUE TO OTHER CAUSE, NOT CLASSIFIED: ICD-10-CM

## 2024-10-01 ENCOUNTER — HOSPITAL ENCOUNTER (OUTPATIENT)
Dept: LAB | Facility: MEDICAL CENTER | Age: 79
End: 2024-10-01
Attending: PHYSICIAN ASSISTANT
Payer: MEDICARE

## 2024-10-01 LAB — PSA SERPL-MCNC: <0.02 NG/ML (ref 0–4)

## 2024-10-01 PROCEDURE — 84403 ASSAY OF TOTAL TESTOSTERONE: CPT

## 2024-10-01 PROCEDURE — 80053 COMPREHEN METABOLIC PANEL: CPT

## 2024-10-01 PROCEDURE — 36415 COLL VENOUS BLD VENIPUNCTURE: CPT

## 2024-10-01 PROCEDURE — 84153 ASSAY OF PSA TOTAL: CPT

## 2024-10-02 LAB
ALBUMIN SERPL BCP-MCNC: 4.1 G/DL (ref 3.2–4.9)
ALBUMIN/GLOB SERPL: 1.8 G/DL
ALP SERPL-CCNC: 88 U/L (ref 30–99)
ALT SERPL-CCNC: 25 U/L (ref 2–50)
ANION GAP SERPL CALC-SCNC: 13 MMOL/L (ref 7–16)
AST SERPL-CCNC: 26 U/L (ref 12–45)
BILIRUB SERPL-MCNC: 0.6 MG/DL (ref 0.1–1.5)
BUN SERPL-MCNC: 21 MG/DL (ref 8–22)
CALCIUM ALBUM COR SERPL-MCNC: 9.5 MG/DL (ref 8.5–10.5)
CALCIUM SERPL-MCNC: 9.6 MG/DL (ref 8.5–10.5)
CHLORIDE SERPL-SCNC: 105 MMOL/L (ref 96–112)
CO2 SERPL-SCNC: 24 MMOL/L (ref 20–33)
CREAT SERPL-MCNC: 0.77 MG/DL (ref 0.5–1.4)
GFR SERPLBLD CREATININE-BSD FMLA CKD-EPI: 92 ML/MIN/1.73 M 2
GLOBULIN SER CALC-MCNC: 2.3 G/DL (ref 1.9–3.5)
GLUCOSE SERPL-MCNC: 108 MG/DL (ref 65–99)
POTASSIUM SERPL-SCNC: 3.7 MMOL/L (ref 3.6–5.5)
PROT SERPL-MCNC: 6.4 G/DL (ref 6–8.2)
SODIUM SERPL-SCNC: 142 MMOL/L (ref 135–145)
TESTOST SERPL-MCNC: <3 NG/DL (ref 175–781)

## 2025-04-15 DIAGNOSIS — Z00.00 ADULT GENERAL MEDICAL EXAM: ICD-10-CM

## 2025-04-15 DIAGNOSIS — E78.5 DYSLIPIDEMIA: ICD-10-CM

## 2025-04-15 RX ORDER — LANSOPRAZOLE 30 MG/1
30 CAPSULE, DELAYED RELEASE ORAL DAILY
Qty: 100 CAPSULE | Refills: 0 | Status: SHIPPED | OUTPATIENT
Start: 2025-04-15

## 2025-04-15 NOTE — TELEPHONE ENCOUNTER
Received request via: Pharmacy    Was the patient seen in the last year in this department? Yes    Does the patient have an active prescription (recently filled or refills available) for medication(s) requested? No    Pharmacy Name:   [id number] - [place], [place] [address] [place] [facility]          Does the patient have longterm Plus and need 100-day supply? (This applies to ALL medications) Yes, quantity updated to 100 days

## 2025-05-02 ENCOUNTER — HOSPITAL ENCOUNTER (OUTPATIENT)
Dept: LAB | Facility: MEDICAL CENTER | Age: 80
End: 2025-05-02
Attending: RADIOLOGY
Payer: MEDICARE

## 2025-05-02 ENCOUNTER — HOSPITAL ENCOUNTER (OUTPATIENT)
Dept: LAB | Facility: MEDICAL CENTER | Age: 80
End: 2025-05-02
Attending: FAMILY MEDICINE
Payer: MEDICARE

## 2025-05-02 DIAGNOSIS — E78.5 DYSLIPIDEMIA: ICD-10-CM

## 2025-05-02 DIAGNOSIS — Z00.00 ADULT GENERAL MEDICAL EXAM: ICD-10-CM

## 2025-05-02 LAB
BASOPHILS # BLD AUTO: 0.9 % (ref 0–1.8)
BASOPHILS # BLD: 0.06 K/UL (ref 0–0.12)
EOSINOPHIL # BLD AUTO: 0.23 K/UL (ref 0–0.51)
EOSINOPHIL NFR BLD: 3.5 % (ref 0–6.9)
ERYTHROCYTE [DISTWIDTH] IN BLOOD BY AUTOMATED COUNT: 45.5 FL (ref 35.9–50)
HCT VFR BLD AUTO: 38.8 % (ref 42–52)
HGB BLD-MCNC: 12.5 G/DL (ref 14–18)
IMM GRANULOCYTES # BLD AUTO: 0.02 K/UL (ref 0–0.11)
IMM GRANULOCYTES NFR BLD AUTO: 0.3 % (ref 0–0.9)
LYMPHOCYTES # BLD AUTO: 1.3 K/UL (ref 1–4.8)
LYMPHOCYTES NFR BLD: 19.9 % (ref 22–41)
MCH RBC QN AUTO: 32.5 PG (ref 27–33)
MCHC RBC AUTO-ENTMCNC: 32.2 G/DL (ref 32.3–36.5)
MCV RBC AUTO: 100.8 FL (ref 81.4–97.8)
MONOCYTES # BLD AUTO: 0.85 K/UL (ref 0–0.85)
MONOCYTES NFR BLD AUTO: 13 % (ref 0–13.4)
NEUTROPHILS # BLD AUTO: 4.06 K/UL (ref 1.82–7.42)
NEUTROPHILS NFR BLD: 62.4 % (ref 44–72)
NRBC # BLD AUTO: 0 K/UL
NRBC BLD-RTO: 0 /100 WBC (ref 0–0.2)
PLATELET # BLD AUTO: 274 K/UL (ref 164–446)
PMV BLD AUTO: 9.6 FL (ref 9–12.9)
RBC # BLD AUTO: 3.85 M/UL (ref 4.7–6.1)
WBC # BLD AUTO: 6.5 K/UL (ref 4.8–10.8)

## 2025-05-02 PROCEDURE — 36415 COLL VENOUS BLD VENIPUNCTURE: CPT

## 2025-05-02 PROCEDURE — 84403 ASSAY OF TOTAL TESTOSTERONE: CPT

## 2025-05-02 PROCEDURE — 80061 LIPID PANEL: CPT

## 2025-05-02 PROCEDURE — 85025 COMPLETE CBC W/AUTO DIFF WBC: CPT

## 2025-05-02 PROCEDURE — 84153 ASSAY OF PSA TOTAL: CPT

## 2025-05-02 PROCEDURE — 80053 COMPREHEN METABOLIC PANEL: CPT

## 2025-05-03 LAB
ALBUMIN SERPL BCP-MCNC: 4 G/DL (ref 3.2–4.9)
ALBUMIN/GLOB SERPL: 1.5 G/DL
ALP SERPL-CCNC: 96 U/L (ref 30–99)
ALT SERPL-CCNC: 22 U/L (ref 2–50)
ANION GAP SERPL CALC-SCNC: 11 MMOL/L (ref 7–16)
AST SERPL-CCNC: 24 U/L (ref 12–45)
BILIRUB SERPL-MCNC: 0.9 MG/DL (ref 0.1–1.5)
BUN SERPL-MCNC: 20 MG/DL (ref 8–22)
CALCIUM ALBUM COR SERPL-MCNC: 9.4 MG/DL (ref 8.5–10.5)
CALCIUM SERPL-MCNC: 9.4 MG/DL (ref 8.5–10.5)
CHLORIDE SERPL-SCNC: 105 MMOL/L (ref 96–112)
CHOLEST SERPL-MCNC: 148 MG/DL (ref 100–199)
CO2 SERPL-SCNC: 27 MMOL/L (ref 20–33)
CREAT SERPL-MCNC: 0.76 MG/DL (ref 0.5–1.4)
GFR SERPLBLD CREATININE-BSD FMLA CKD-EPI: 92 ML/MIN/1.73 M 2
GLOBULIN SER CALC-MCNC: 2.7 G/DL (ref 1.9–3.5)
GLUCOSE SERPL-MCNC: 111 MG/DL (ref 65–99)
HDLC SERPL-MCNC: 36 MG/DL
LDLC SERPL CALC-MCNC: 83 MG/DL
POTASSIUM SERPL-SCNC: 3.6 MMOL/L (ref 3.6–5.5)
PROT SERPL-MCNC: 6.7 G/DL (ref 6–8.2)
PSA SERPL DL<=0.01 NG/ML-MCNC: <0.02 NG/ML (ref 0–4)
SODIUM SERPL-SCNC: 143 MMOL/L (ref 135–145)
TESTOST SERPL-MCNC: 36 NG/DL (ref 175–781)
TRIGL SERPL-MCNC: 144 MG/DL (ref 0–149)

## 2025-05-04 ENCOUNTER — RESULTS FOLLOW-UP (OUTPATIENT)
Dept: MEDICAL GROUP | Facility: PHYSICIAN GROUP | Age: 80
End: 2025-05-04

## 2025-05-05 ENCOUNTER — HOSPITAL ENCOUNTER (OUTPATIENT)
Facility: MEDICAL CENTER | Age: 80
End: 2025-05-05
Attending: FAMILY MEDICINE
Payer: MEDICARE

## 2025-05-05 LAB
APPEARANCE UR: CLEAR
BACTERIA #/AREA URNS HPF: NORMAL /HPF
BILIRUB UR QL STRIP.AUTO: NEGATIVE
CASTS URNS QL MICRO: NORMAL /LPF (ref 0–2)
COLOR UR: ABNORMAL
EPITHELIAL CELLS 1715: NORMAL /HPF (ref 0–5)
GLUCOSE UR STRIP.AUTO-MCNC: NEGATIVE MG/DL
HYALINE CAST   1831: PRESENT /LPF
KETONES UR STRIP.AUTO-MCNC: ABNORMAL MG/DL
LEUKOCYTE ESTERASE UR QL STRIP.AUTO: ABNORMAL
MICRO URNS: ABNORMAL
MUCOUS THREADS URNS QL MICRO: PRESENT /HPF
NITRITE UR QL STRIP.AUTO: NEGATIVE
PH UR STRIP.AUTO: 6 [PH] (ref 5–8)
PROT UR QL STRIP: NEGATIVE MG/DL
RBC # URNS HPF: NORMAL /HPF (ref 0–2)
RBC UR QL AUTO: NEGATIVE
SP GR UR STRIP.AUTO: 1.02
UROBILINOGEN UR STRIP.AUTO-MCNC: 0.2 EU/DL
WBC #/AREA URNS HPF: NORMAL /HPF

## 2025-05-05 PROCEDURE — 81001 URINALYSIS AUTO W/SCOPE: CPT

## 2025-05-09 ENCOUNTER — APPOINTMENT (OUTPATIENT)
Dept: MEDICAL GROUP | Facility: PHYSICIAN GROUP | Age: 80
End: 2025-05-09

## 2025-05-09 VITALS
BODY MASS INDEX: 32.07 KG/M2 | DIASTOLIC BLOOD PRESSURE: 68 MMHG | TEMPERATURE: 98.2 F | HEIGHT: 70 IN | SYSTOLIC BLOOD PRESSURE: 116 MMHG | OXYGEN SATURATION: 97 % | WEIGHT: 224 LBS | HEART RATE: 74 BPM | RESPIRATION RATE: 18 BRPM

## 2025-05-09 DIAGNOSIS — E78.5 DYSLIPIDEMIA: ICD-10-CM

## 2025-05-09 DIAGNOSIS — E87.6 HYPOKALEMIA: ICD-10-CM

## 2025-05-09 DIAGNOSIS — I70.0 AORTIC ATHEROSCLEROSIS (HCC): ICD-10-CM

## 2025-05-09 DIAGNOSIS — D17.23 LIPOMA OF RIGHT LOWER EXTREMITY: ICD-10-CM

## 2025-05-09 DIAGNOSIS — N40.0 BENIGN PROSTATIC HYPERPLASIA WITHOUT LOWER URINARY TRACT SYMPTOMS: ICD-10-CM

## 2025-05-09 DIAGNOSIS — Z23 NEED FOR VACCINATION: ICD-10-CM

## 2025-05-09 DIAGNOSIS — R53.82 CHRONIC FATIGUE: ICD-10-CM

## 2025-05-09 DIAGNOSIS — I10 ESSENTIAL HYPERTENSION: ICD-10-CM

## 2025-05-09 DIAGNOSIS — C61 CARCINOMA OF PROSTATE (HCC): ICD-10-CM

## 2025-05-09 DIAGNOSIS — D63.8 ANEMIA OF CHRONIC DISEASE: ICD-10-CM

## 2025-05-09 DIAGNOSIS — Z00.00 ADULT GENERAL MEDICAL EXAM: ICD-10-CM

## 2025-05-09 PROCEDURE — 3078F DIAST BP <80 MM HG: CPT | Performed by: FAMILY MEDICINE

## 2025-05-09 PROCEDURE — 3074F SYST BP LT 130 MM HG: CPT | Performed by: FAMILY MEDICINE

## 2025-05-09 PROCEDURE — 90471 IMMUNIZATION ADMIN: CPT | Performed by: FAMILY MEDICINE

## 2025-05-09 PROCEDURE — 90715 TDAP VACCINE 7 YRS/> IM: CPT | Performed by: FAMILY MEDICINE

## 2025-05-09 PROCEDURE — 99214 OFFICE O/P EST MOD 30 MIN: CPT | Mod: 25 | Performed by: FAMILY MEDICINE

## 2025-05-09 RX ORDER — TAMSULOSIN HYDROCHLORIDE 0.4 MG/1
0.4 CAPSULE ORAL
Qty: 100 CAPSULE | Refills: 3 | Status: SHIPPED | OUTPATIENT
Start: 2025-05-09

## 2025-05-09 RX ORDER — METOPROLOL SUCCINATE 100 MG/1
100 TABLET, EXTENDED RELEASE ORAL 2 TIMES DAILY
Qty: 200 TABLET | Refills: 3 | Status: SHIPPED | OUTPATIENT
Start: 2025-05-09

## 2025-05-09 RX ORDER — HYDROCHLOROTHIAZIDE 25 MG/1
25 TABLET ORAL
Qty: 100 TABLET | Refills: 3 | Status: SHIPPED | OUTPATIENT
Start: 2025-05-09

## 2025-05-09 RX ORDER — ATORVASTATIN CALCIUM 20 MG/1
20 TABLET, FILM COATED ORAL NIGHTLY
Qty: 100 TABLET | Refills: 3 | Status: SHIPPED | OUTPATIENT
Start: 2025-05-09

## 2025-05-09 RX ORDER — FAMOTIDINE 20 MG/1
20 TABLET, FILM COATED ORAL NIGHTLY
Qty: 100 TABLET | Refills: 3 | Status: SHIPPED | OUTPATIENT
Start: 2025-05-09

## 2025-05-09 RX ORDER — POTASSIUM CHLORIDE 1500 MG/1
20 TABLET, EXTENDED RELEASE ORAL 2 TIMES DAILY
Qty: 200 TABLET | Refills: 3 | Status: SHIPPED | OUTPATIENT
Start: 2025-05-09

## 2025-05-09 RX ORDER — LANSOPRAZOLE 30 MG/1
30 CAPSULE, DELAYED RELEASE ORAL DAILY
Qty: 100 CAPSULE | Refills: 3 | Status: SHIPPED | OUTPATIENT
Start: 2025-05-09

## 2025-05-09 ASSESSMENT — PATIENT HEALTH QUESTIONNAIRE - PHQ9: CLINICAL INTERPRETATION OF PHQ2 SCORE: 0

## 2025-05-09 ASSESSMENT — FIBROSIS 4 INDEX: FIB4 SCORE: 1.46

## 2025-05-09 NOTE — ASSESSMENT & PLAN NOTE
Patient is here for his annual exam.  Overall he states he is doing very well and having no particular concerns.  He is having a little worsening heartburn and would like something for that.  He is currently taking Prevacid on a regular basis.

## 2025-05-09 NOTE — ASSESSMENT & PLAN NOTE
This is now chronic problem.  His labs show likely anemia of chronic disease.  Will continue to follow.

## 2025-05-09 NOTE — ASSESSMENT & PLAN NOTE
This is a new problem.  Patient has noted a subcutaneous lump just above the right knee.  He states it is uncomfortable if he presses on it.  Does not know if it is changed or not.  No overlying skin changes.

## 2025-05-09 NOTE — ASSESSMENT & PLAN NOTE
This is a chronic problem.  Likely due to his diuretic.  Last test was within normal limits.  Medication be refilled.

## 2025-05-09 NOTE — ASSESSMENT & PLAN NOTE
This is now a chronic problem.  His fatigue really came on with treatment for his prostate cancer.  This most likely due to suppression of his testosterone.  Will continue to follow.

## 2025-05-09 NOTE — PROGRESS NOTES
Subjective:     CC: Here for exam and medication refills.  Also to go over the results of his lab test.    HPI:   Juma presents today with the following medical concerns:    Adult general medical exam  Patient is here for his annual exam.  Overall he states he is doing very well and having no particular concerns.  He is having a little worsening heartburn and would like something for that.  He is currently taking Prevacid on a regular basis.    Carcinoma of prostate (HCC)  This is a chronic problem.  Followed by urology.    Hypokalemia  This is a chronic problem.  Likely due to his diuretic.  Last test was within normal limits.  Medication be refilled.    Dyslipidemia  This is a chronic problem.  Medication refilled.  Labs look very good.    Chronic fatigue  This is now a chronic problem.  His fatigue really came on with treatment for his prostate cancer.  This most likely due to suppression of his testosterone.  Will continue to follow.    Anemia of chronic disease  This is now chronic problem.  His labs show likely anemia of chronic disease.  Will continue to follow.    Aortic atherosclerosis (HCC)  This is noted on a previous chest x-ray.  He is on a statin.    Lipoma of right lower extremity  This is a new problem.  Patient has noted a subcutaneous lump just above the right knee.  He states it is uncomfortable if he presses on it.  Does not know if it is changed or not.  No overlying skin changes.    Past Medical History:   Diagnosis Date    Arthritis     lower back    BPH (benign prostatic hyperplasia)     Cancer (HCC) 2017    [place] Tumor Removed    Cataract 2020    GERD (gastroesophageal reflux disease)     Glaucoma     in right eye, no drops    Hay fever 06/04/2021    Heart burn     Hyperlipidemia     Hypertension     well controlled on meds    Indigestion     pt on prilosec    Obstructive uropathy 06/04/2021    Urinary bladder disorder 2017    Tumor Removed       Social History     Tobacco Use    Smoking  status: Former     Current packs/day: 0.00     Average packs/day: 1 pack/day for 10.0 years (10.0 ttl pk-yrs)     Types: Cigarettes     Start date: 1976     Quit date: 1986     Years since quittin.3    Smokeless tobacco: Never    Tobacco comments:     Early 20's, then quit for yrs, started up again, finally quit for good.   Vaping Use    Vaping status: Never Used   Substance Use Topics    Alcohol use: Yes     Alcohol/week: 0.6 oz     Types: 1 Glasses of wine per week     Comment: wine every dinner    Drug use: Never       Current Outpatient Medications Ordered in Epic   Medication Sig Dispense Refill    atorvastatin (LIPITOR) 20 MG Tab Take 1 Tablet by mouth every evening. 100 Tablet 3    hydroCHLOROthiazide 25 MG Tab Take 1 Tablet by mouth every day. 100 Tablet 3    metoprolol SR (TOPROL XL) 100 MG TABLET SR 24 HR Take 1 Tablet by mouth 2 times a day. 200 Tablet 3    potassium chloride SA (KLOR-CON M20) 20 MEQ Tab CR Take 1 Tablet by mouth 2 times a day. 200 Tablet 3    tamsulosin (FLOMAX) 0.4 MG capsule Take 1 Capsule by mouth every morning before breakfast. 100 Capsule 3    lansoprazole (PREVACID) 30 MG CAPSULE DELAYED RELEASE Take 1 Capsule by mouth every day. Needs an appointment before further refills 100 Capsule 3    famotidine (PEPCID) 20 MG Tab Take 1 Tablet by mouth every evening. 100 Tablet 3    Multiple Vitamin (MULTIVITAMIN ADULT PO) Take 1 Tablet by mouth every morning.      niacin  MG Cap CR Take 500 mg by mouth at bedtime.      Coenzyme Q10 (CO Q10) 100 MG Cap Take 300 mg by mouth every morning.      Cholecalciferol (VITAMIN D) 2000 UNITS CAPS Take 2,000 Units by mouth every morning.       No current [facility]-ordered facility-administered medications on file.       Allergies:  Patient has no known allergies.    Health Maintenance: Completed    ROS:  Gen: no fevers/chills, no changes in weight  Eyes: no changes in vision  ENT: no sore throat, no hearing loss, no bloody nose  Pulm: no  "sob, no cough  CV: no chest pain, no palpitations  GI: no nausea/vomiting, no diarrhea  : no dysuria  MSk: no myalgias  Skin: no rash  Neuro: no headaches, no numbness/tingling  Heme/Lymph: no easy bruising      Objective:       Exam:  /68 (BP Location: Right arm, Patient Position: Sitting, BP Cuff Size: Adult)   Pulse 74   Temp 36.8 °C (98.2 °F) (Temporal)   Resp 18   Ht 1.778 m (5' 10\")   Wt 102 kg (224 lb)   SpO2 97%   BMI 32.14 kg/m²  Body mass index is 32.14 kg/m².    Gen: Alert and oriented, No apparent distress.  Eyes:   Extraocular motions intact.  No scleral icterus seen.  Ears:    Ear canals and TMs are clear.  Neck: Neck is supple without lymphadenopathy.  Thyroid exam is normal.  Lungs: Normal effort, CTA bilaterally, no wheezes, rhonchi, or rales  CV: Regular rate and rhythm. No rubs, or gallops.  Soft 1/6 systolic murmur loudest at the aortic area unchanged.  No carotid bruits.  Abdomen: Soft, nontender, organomegaly or masses.  Ext: No clubbing, cyanosis, edema.  Neuro: Cranial nerves II through VIII are gross intact.  No lateralized signs are seen.  Gait is normal.  Skin:     Patient has a small mobile subcutaneous lesion just to the area above the right knee.  No overlying skin changes seen.  It is firm.  Consistent with a lipoma.    Labs: Results reviewed with patient    Assessment & Plan:     79 y.o. male with the following -     1. Need for vaccination  This is a immunization issue.  Booster given at his request.  - Tdap Vaccine =>8YO IM    2. Adult general medical exam  Patient's exam was performed.  Unchanged from previous visit.  General health issues addressed and lab results reviewed with patient.    3. Essential hypertension  This is a chronic stable condition.  Medications refilled.  - hydroCHLOROthiazide 25 MG Tab; Take 1 Tablet by mouth every day.  Dispense: 100 Tablet; Refill: 3  - metoprolol SR (TOPROL XL) 100 MG TABLET SR 24 HR; Take 1 Tablet by mouth 2 times a day.  " Dispense: 200 Tablet; Refill: 3    4. Hypokalemia  This is a chronic problem.  Medication refilled.  - potassium chloride SA (KLOR-CON M20) 20 MEQ Tab CR; Take 1 Tablet by mouth 2 times a day.  Dispense: 200 Tablet; Refill: 3    5. Benign prostatic hyperplasia without lower urinary tract symptoms  This is a chronic problem.  Medication refilled.   - tamsulosin (FLOMAX) 0.4 MG capsule; Take 1 Capsule by mouth every morning before breakfast.  Dispense: 100 Capsule; Refill: 3    6. Carcinoma of prostate (HCC)   This is a chronic problem.  Continue care through urology. He has had a very good response to treatment.    7. Dyslipidemia  This is a chronic stable condition.  Labs reviewed with patient.  Medication renewed.      8. Chronic fatigue  This is a chronic problem.  Likely due to his hormonal treatment given for his prostate cancer.  Will continue to follow.    9. Anemia of chronic disease  This is a chronic problem.  Likely due to his underlying condition.  If it gets lower then we will do further workup.    10. Aortic atherosclerosis (HCC)  This is a chronic problem.  Noted on previous chest x-ray study.  He is already on a statin.    11. Lipoma of right lower extremity  This is a new issue.  Patient was told it feels benign.  Will follow for changes.    HCC Gap Form    Diagnosis to address: C61 - Carcinoma of prostate (HCC)  Assessment and plan: Chronic, stable, as based on today's assessment and impact on other conditions evaluated today. Continue with current treatment plan: Followed by urology.  Follow-up with specialist as directed, but at least annually.  Last edited 05/09/25 11:32 PDT by Yefri Iqbal III, M.D.         Return in about 6 months (around 11/9/2025) for Long.    Please note that this dictation was created using voice recognition software. I have made every reasonable attempt to correct obvious errors, but I expect that there are errors of grammar and possibly content that I did not discover  before finalizing the note.

## 2025-06-12 DIAGNOSIS — E78.5 DYSLIPIDEMIA: Primary | ICD-10-CM

## 2025-06-12 RX ORDER — ATORVASTATIN CALCIUM 20 MG/1
20 TABLET, FILM COATED ORAL NIGHTLY
Qty: 100 TABLET | Refills: 3 | Status: SHIPPED | OUTPATIENT
Start: 2025-06-12

## 2025-06-12 NOTE — PROGRESS NOTES
"[facility]    Pt has seen a [facility] provider in past 12 months and in need of prescription refills per protocol.  A 100 day supply is provided whenever possible to improve adherence rates.     Lab Results   Component Value Date/Time    HBA1C 5.5 02/02/2017 12:20 PM      No results found for: \"MICROALBCALC\", \"MALBCRT\", \"MALBEXCR\", \"FQFLRJ19\", \"MICROALBUR\", \"MICRALB\", \"UMICROALBUM\", \"MICROALBTIM\"   Lab Results   Component Value Date/Time    ALKPHOSPHAT 96 05/02/2025 02:38 PM    ASTSGOT 24 05/02/2025 02:38 PM    ALTSGPT 22 05/02/2025 02:38 PM    TBILIRUBIN 0.9 05/02/2025 02:38 PM      Lab Results   Component Value Date/Time    SODIUM 143 05/02/2025 02:38 PM    POTASSIUM 3.6 05/02/2025 02:38 PM    CHLORIDE 105 05/02/2025 02:38 PM    CO2 27 05/02/2025 02:38 PM    GLUCOSE 111 (H) 05/02/2025 02:38 PM    BUN 20 05/02/2025 02:38 PM    CREATININE 0.76 05/02/2025 02:38 PM    CREATININE 0.79 04/11/2011 12:58 PM    BUNCREATRAT 15 04/11/2011 12:58 PM    GLOMRATE >59 03/25/2011 11:15 AM        Meds refilled:  Atorvastatin, pt agrees to automatic refills via mail.     Chu Reynolds, PharmD         "

## 2025-08-19 PROCEDURE — RXMED WILLOW AMBULATORY MEDICATION CHARGE: Performed by: FAMILY MEDICINE

## 2025-08-21 ENCOUNTER — PHARMACY VISIT (OUTPATIENT)
Dept: PHARMACY | Facility: MEDICAL CENTER | Age: 80
End: 2025-08-21
Payer: COMMERCIAL

## (undated) DEVICE — SPONGE GAUZESTER 4 X 4 4PLY - (128PK/CA)

## (undated) DEVICE — BAG DRAINAGE URINARY CLOSED 2000ML (20EA/CA)

## (undated) DEVICE — BAG DRAINAGE ANTIREFLUX TOWER SLIDE TAP 4000 ML (20EA/CA)

## (undated) DEVICE — JELLY SURGILUBE STERILE TUBE 4.25 OZ (1/EA)

## (undated) DEVICE — SYRINGE 50ML CATHETER TIP (40EA/BX 4BX/CA)

## (undated) DEVICE — COVER LIGHT HANDLE ALC PLUS DISP (18EA/BX)

## (undated) DEVICE — PACK CYSTOSCOPY III - (8/CA)

## (undated) DEVICE — ELECTRODE DUAL RETURN W/ CORD - (50/PK)

## (undated) DEVICE — GOWN SURGICAL X-LARGE ULTRA - FILM-REINFORCED (20/CA)

## (undated) DEVICE — SET EXTENSION WITH 2 PORTS (48EA/CA) ***PART #2C8610 IS A SUBSTITUTE*****

## (undated) DEVICE — EVACUATOR BLADDER ELLIK - (10/BX)

## (undated) DEVICE — CATHETER URETHRAL FOLEY SILICONE OD16 FR 10 ML (10EA/CA)

## (undated) DEVICE — WATER IRRIG. STER 3000 ML - (4/CA)

## (undated) DEVICE — SLEEVE, VASO, THIGH, MED

## (undated) DEVICE — SET LEADWIRE 5 LEAD BEDSIDE DISPOSABLE ECG (1SET OF 5/EA)

## (undated) DEVICE — PACK SINGLE BASIN - (6/CA)

## (undated) DEVICE — LACTATED RINGERS INJ 1000 ML - (14EA/CA 60CA/PF)

## (undated) DEVICE — SUCTION INSTRUMENT YANKAUER BULBOUS TIP W/O VENT (50EA/CA)

## (undated) DEVICE — GLOVE BIOGEL SZ 7.5 SURGICAL PF LTX - (50PR/BX 4BX/CA)

## (undated) DEVICE — CANISTER SUCTION 3000ML MECHANICAL FILTER AUTO SHUTOFF MEDI-VAC NONSTERILE LF DISP  (40EA/CA)

## (undated) DEVICE — COVER FOOT UNIVERSAL DISP. - (25EA/CA)

## (undated) DEVICE — SET EPIDURAL MICRO SAPPHIRE EPIDAURAL INFUSION  (1/EA)

## (undated) DEVICE — SET IRRIGATION CYSTOSCOPY Y-TYPE L81 IN (20EA/CA)

## (undated) DEVICE — BAG URODRAIN WITH TUBING - (20/CA)

## (undated) DEVICE — GOWN WARMING STANDARD FLEX - (30/CA)

## (undated) DEVICE — GLOVE SZ 6.5 BIOGEL PI MICRO - PF LF (50PR/BX)

## (undated) DEVICE — GOWN SURGEONS X-LARGE - DISP. (30/CA)

## (undated) DEVICE — CORD ELECTROSURG. TUR DISP (50EA/CA)

## (undated) DEVICE — SENSOR OXIMETER ADULT SPO2 RD SET (20EA/BX)

## (undated) DEVICE — CONNECTOR HOSE NEPTUNE FOR CYSTO ROOM

## (undated) DEVICE — TUBING CLEARLINK DUO-VENT - C-FLO (48EA/CA)